# Patient Record
Sex: FEMALE | Race: ASIAN | NOT HISPANIC OR LATINO | Employment: FULL TIME | URBAN - METROPOLITAN AREA
[De-identification: names, ages, dates, MRNs, and addresses within clinical notes are randomized per-mention and may not be internally consistent; named-entity substitution may affect disease eponyms.]

---

## 2017-05-15 ENCOUNTER — COMMUNICATION - HEALTHEAST (OUTPATIENT)
Dept: OBGYN | Facility: HOSPITAL | Age: 27
End: 2017-05-15

## 2019-05-24 ENCOUNTER — AMBULATORY - HEALTHEAST (OUTPATIENT)
Dept: LAB | Facility: CLINIC | Age: 29
End: 2019-05-24

## 2019-05-24 ENCOUNTER — OFFICE VISIT - HEALTHEAST (OUTPATIENT)
Dept: MIDWIFE SERVICES | Facility: CLINIC | Age: 29
End: 2019-05-24

## 2019-05-24 ENCOUNTER — COMMUNICATION - HEALTHEAST (OUTPATIENT)
Dept: ADMINISTRATIVE | Facility: CLINIC | Age: 29
End: 2019-05-24

## 2019-05-24 ENCOUNTER — AMBULATORY - HEALTHEAST (OUTPATIENT)
Dept: MIDWIFE SERVICES | Facility: CLINIC | Age: 29
End: 2019-05-24

## 2019-05-24 DIAGNOSIS — N92.6 MISSED MENSES: ICD-10-CM

## 2019-05-24 DIAGNOSIS — Z32.01 PREGNANCY CONFIRMED BY POSITIVE URINE TEST: ICD-10-CM

## 2019-05-24 LAB — HCG UR QL: POSITIVE

## 2019-05-24 ASSESSMENT — MIFFLIN-ST. JEOR: SCORE: 1201.8

## 2019-06-07 ENCOUNTER — COMMUNICATION - HEALTHEAST (OUTPATIENT)
Dept: OBGYN | Facility: CLINIC | Age: 29
End: 2019-06-07

## 2019-06-08 ENCOUNTER — AMBULATORY - HEALTHEAST (OUTPATIENT)
Dept: MIDWIFE SERVICES | Facility: CLINIC | Age: 29
End: 2019-06-08

## 2019-06-08 ENCOUNTER — COMMUNICATION - HEALTHEAST (OUTPATIENT)
Dept: SCHEDULING | Facility: CLINIC | Age: 29
End: 2019-06-08

## 2019-06-08 ENCOUNTER — COMMUNICATION - HEALTHEAST (OUTPATIENT)
Dept: OBGYN | Facility: CLINIC | Age: 29
End: 2019-06-08

## 2019-06-08 DIAGNOSIS — O03.9 SAB (SPONTANEOUS ABORTION): ICD-10-CM

## 2019-06-10 ENCOUNTER — OFFICE VISIT - HEALTHEAST (OUTPATIENT)
Dept: MIDWIFE SERVICES | Facility: CLINIC | Age: 29
End: 2019-06-10

## 2019-06-10 DIAGNOSIS — O03.9 SAB (SPONTANEOUS ABORTION): ICD-10-CM

## 2019-06-10 DIAGNOSIS — Z87.59 HISTORY OF MISCARRIAGE: ICD-10-CM

## 2019-06-10 LAB — HCG SERPL-ACNC: 24 MLU/ML (ref 0–4)

## 2019-06-10 ASSESSMENT — MIFFLIN-ST. JEOR: SCORE: 1185.35

## 2019-06-11 ENCOUNTER — COMMUNICATION - HEALTHEAST (OUTPATIENT)
Dept: OBGYN | Facility: CLINIC | Age: 29
End: 2019-06-11

## 2019-06-11 ENCOUNTER — AMBULATORY - HEALTHEAST (OUTPATIENT)
Dept: OBGYN | Facility: CLINIC | Age: 29
End: 2019-06-11

## 2019-06-11 DIAGNOSIS — O03.9 SAB (SPONTANEOUS ABORTION): ICD-10-CM

## 2019-06-17 ENCOUNTER — AMBULATORY - HEALTHEAST (OUTPATIENT)
Dept: MIDWIFE SERVICES | Facility: CLINIC | Age: 29
End: 2019-06-17

## 2019-06-17 ENCOUNTER — COMMUNICATION - HEALTHEAST (OUTPATIENT)
Dept: ADMINISTRATIVE | Facility: CLINIC | Age: 29
End: 2019-06-17

## 2019-06-17 ENCOUNTER — OFFICE VISIT - HEALTHEAST (OUTPATIENT)
Dept: MIDWIFE SERVICES | Facility: CLINIC | Age: 29
End: 2019-06-17

## 2019-06-17 DIAGNOSIS — Z01.812 PRE-PROCEDURE LAB EXAM: ICD-10-CM

## 2019-06-17 DIAGNOSIS — O03.9 SAB (SPONTANEOUS ABORTION): ICD-10-CM

## 2019-06-17 DIAGNOSIS — Z30.09 ENCOUNTER FOR OTHER GENERAL COUNSELING OR ADVICE ON CONTRACEPTION: ICD-10-CM

## 2019-06-17 LAB — HCG SERPL-ACNC: 3 MLU/ML (ref 0–4)

## 2019-06-17 ASSESSMENT — MIFFLIN-ST. JEOR: SCORE: 1162.67

## 2019-06-18 ENCOUNTER — OFFICE VISIT - HEALTHEAST (OUTPATIENT)
Dept: MIDWIFE SERVICES | Facility: CLINIC | Age: 29
End: 2019-06-18

## 2019-06-18 ENCOUNTER — AMBULATORY - HEALTHEAST (OUTPATIENT)
Dept: LAB | Facility: CLINIC | Age: 29
End: 2019-06-18

## 2019-06-18 DIAGNOSIS — Z97.5 NEXPLANON IN PLACE: ICD-10-CM

## 2019-06-18 DIAGNOSIS — Z30.017 NEXPLANON INSERTION: ICD-10-CM

## 2019-06-18 DIAGNOSIS — Z01.812 PRE-PROCEDURE LAB EXAM: ICD-10-CM

## 2019-06-18 LAB — HCG UR QL: NEGATIVE

## 2019-11-27 ENCOUNTER — COMMUNICATION - HEALTHEAST (OUTPATIENT)
Dept: MIDWIFE SERVICES | Facility: CLINIC | Age: 29
End: 2019-11-27

## 2021-05-29 NOTE — PROGRESS NOTES
"Subjective:Oly (pronounced E-wan) presents to clinic with her young daughter.  She began having cramping and bleeding last Friday, 1 week ago and was seen in the ER and passed tissue the following day on Saturday.  Patient had a beta hCG drawn on 6/8/2019 which was 68.  She has a future order for a follow-up beta hCG.  I recommended she have this done following her appointment.  She also had an ultrasound which showed that her uterine cavity was empty but could not rule out an ectopic.  Patient denies pelvic pain, in particular she denies unilateral pelvic pain.  Patient feels sure that she passed the pregnancy in the morning prior to going to the emergency department having an ultrasound.  Patient tells me she does not wish to become pregnant and tells me she cannot go through this again.  She is interested in having a Nexplanon placed.  Recommended she come back to clinic for a Nexplanon consultation and that an insertion would be scheduled at that time.  I did talk to her about the possibility of becoming pregnant and recommended she use a condom until another method is in place.    Objective:BP 98/64 (Patient Site: Right Arm, Patient Position: Sitting, Cuff Size: Adult Regular)   Pulse 77   Ht 4' 10\" (1.473 m)   Wt 128 lb (58.1 kg)   LMP 04/15/2019 (Exact Date)   SpO2 96%   BMI 26.75 kg/m      6/8/2019 : quantitative beta-hCG  68    EXAM: US OB < 14 WEEKS WITH TRANSVAGINAL  LOCATION: [facility]  DATE/TIME: 6/8/2019 11:13 AM     INDICATION: bleeding, eval for IUP  COMPARISON: None.  TECHNIQUE: Transabdominal scans were performed. Endovaginal ultrasound was performed to better visualize the embryo.     FINDINGS:  UTERUS: An intrauterine pregnancy is not seen. The endometrial measures 7 mm in diameter     RIGHT OVARY: There is a 1.1 cm hypoechoic area in the right ovary that could be a corpus luteum cyst  LEFT OVARY: Normal.     IMPRESSION:   CONCLUSION:   1.  An intrauterine pregnancy is not seen. " An ectopic pregnancy cannot be excluded     2.  There is a trace free fluid in the cul-de-sac. A 1.1 cm hypoechoic area along the margin the right ovary may be a corpus luteum    Assessment:  29-year-old -0-1-4  First trimester miscarriage  Desires Nexplanon     Plan:  -Follow-up quantitative beta-hCG today  -Return to clinic for Nexplanon consultation.  Patient counseled to use condoms or abstain from intercourse until Nexplanon is placed and follow-up instructions have been reviewed.  -Call the midwife on call at [phone] in the case that unilateral pelvic pain or vaginal bleeding develops.    Total time spent with patient 25 minutes.  >50% of the time spent counseling and coordinating care.    Piyush HERNÁNDEZ CNM    6/10/2019  6:30 PM

## 2021-05-29 NOTE — PROGRESS NOTES
Subjective:  Pt desires Nexplanon insertion for contraception.   Had recent SAB with a beta-hcg quant <3 as of yesterday.    Knows she needs condom use for STI prevention    Nexplanon Insertion Procedure Note    Pre-operative Diagnosis: desires Nexplanon for contraception    PMH:  No known contraindications to Nexplanon  No current or past history of thrombosis or thromboembolic disorders   No hepatic tumors or active liver disease   No undiagnosed abnormal genital bleeding   No known or suspected carcinoma of the breast or personal history of breast cancer   No hypersensitivity to any of the components of NEXPLANON  No known history of keloids    Post-operative Diagnosis: same    Indications: contraception    Procedure Details   Beta-quant done yesterday and was negative. UPT today negative. Has not had IC since prior to SAB.  The risks (including infection, bleeding, pain) and benefits of the procedure were explained to the patient and Written informed consent was obtained.      Pt was positioned on exam table with left, non dominant arm next to her on the table. Measurements taken and insertion marks placed 8 cms from medial epicondyle of elbow and 3-5 cms posteriorly from sulcus, over patient's triceps muscle. Insertion site was cleaned with alcohol prep at 8cm from the medial epicondyle of the humerus  . 1% Lidocaine inserted intradermally. Site then cleansed with Betadine swab x 3.  Nexplanon was inserted at less than 30 degree angle, then applicator leveled horizontally and skin tented, needle inserted full length and slider retracted. Scant bleeding noted at insertion site. Site was covered with band aid and a pressure dressing. Pt instructed not to lift heavy items with left arm for 24 hours, Ibuprofen prn for pain or ice to site as need for bruising.  Implant easily palpated by CNM and patient.     Nexplanon Information:  Lot [id number], Exp 9.23.2021  Removal date:  6/18/2022.    Condition:  Stable    Complications:  None    Plan:  Discussed risks and benefits of nexplanon insertion, discussed MOA, efficacy, and duration of use. Reviewed common side effects including local site reaction to insertion, transient increase in ovarian cysts, irregular bleeding and reviewed possible side effects of progesterones.  Reviewed that risk of pregnancy is low however should a pregnancy occur higher chance that this is a tubal pregnancy, with any positive pregnancy test would recommend immediate visit with provider.  Discussed low risk but possibility of placement in a vein, reviewed how we decrease this chance, but signs reviewed.   Discussed it may improve heavy painful periods, but common complaint of Nexplanon is the irregularity of bleeding that may or may not improve with time, we discussed that use of oral contraception as well as NSAIDs may help with irregular heavy bleeding and encourage the patient to let the nurse midwives know if experiencing the symptoms especially if this may lead to desire to remove device.  The patient was advised to call for any fever or for prolonged or severe pain or bleeding.   She was advised to use OTC ibuprofen as needed for mild to moderate pain.   Advised to use a back up method x 7 days or abstain from sex with individuals who produce sperm.  All questions answered.     BETTY Daly, RAMYA  6/18/2019  1:52 PM

## 2021-05-29 NOTE — TELEPHONE ENCOUNTER
"Telephone encounter: Patient pages due to vaginal bleeding that started at 12:15 PM today.  She felt normal this morning and felt a slight cramp in her pelvis and went to the bathroom and passed \" a lot\" of blood followed by other vaginal bleeding that was \"like a period.\".  She is wearing a pad and has not had a change given her bleeding has only started within the last 30 minutes.  She is very tearful as we are talking on the phone.    She was recently seen for a confirmation of pregnancy visit on 5/24/2019.  She had a positive pregnancy test in clinic.  Her last menstrual period was on 4/15/2019 with cycles occurring every 20 to 30 days.  Per this LMP she is 7 weeks pregnant today.  She has not yet had a first trimester ultrasound.    Per her chart her blood type is B+.    I recommended she go immediately to OB urgent care at [facility] located at [facility].  Discussed with patient that vaginal bleeding in the first trimester can be very serious particularly since she has not yet had a first trimester ultrasound and I do not know if her pregnancy is intrauterine.  Patient has her children with her but states there is another adult she can leave them with at home.  She plans to go to OB urgent care right away.    BETTY Amor,RAMYA        "

## 2021-05-29 NOTE — TELEPHONE ENCOUNTER
"  Call from pt    Had called [facility] yesterday with vaginal bleeding (see note)       She may be about 4-6 wks along in pregnancy       More cramping this am - had passed a \"golf ball\" sized material \"looked like a clot\" - red in color  - not grey / white color         No dizziness / lightheadedness   No pain elsewhere       A/P:   > Directed to ED (she is heading to [facility]) for eval        Teodoro Adrian RN   Triage and Medication Refills            Reason for Disposition    [1] SEVERE vaginal bleeding (i.e., soaking 2 pads / hour, large blood clots) AND [2] present 2 or more hours    Protocols used: PREGNANCY - VAGINAL BLEEDING LESS THAN 20 WEEKS EGA-A-MIRIAN      "

## 2021-05-29 NOTE — PROGRESS NOTES
Subjective:Oly presents to clinic for a Nexplanon and IUD consult. She had a recent SAB will have another quantitative beta hcg lab drawn today.  She has not had intercourse since the SAB and does not wish to become pregnant.     The following were communicated to the patient and all questions were addressed:    Nexplanon as a form of contraception intended to prevent pregnancy. It is greater than 99% effective when used correctly.     No contraceptive method is 100% effective, including Nexplanon    Nexplanon is a hormone mixed in a plastic jesse that is inserted under the skin of the non-dominant arm.     Nexplanon must be removed at the end of 3 years and can be removed sooner if the patient desires.    There is a slight risk of getting a scar, the implant migrating, or an infection from the procedure    Removal is usually a small office procedure however it may be difficult and rarely the implant may not be found when it is time to remove it.  A special procedure may need to be performed, possibly surgery, to remove it.  Some women have changes with their menstrual cycle including irregular bleeding, lighter or heavier bleeding, or bleeding may completely stop.    Nexplanon does not protect from HIV or other sexually transmitted infection.    After placement of Nexplanon a patient should check that it is in place by gently pressing her fingertips over the skin of her arm where the Nexplanon was inserted.  She should be able to feel the small jesse and if not should contact her health care provider.    Patient is recommended to contact her health provider in the case that you have signs or symptoms of infection, do not feel the jesse in place, or if you think you may be pregnant.    Mirena IUD consult completed.  Discussed procedure, risks, and benefits.      BENEFITS: The IUD is 97-99% effective if all the directions regarding its use are followed carfeully. IUDs containing progestin may decrease menstrual flow and  "painful menstrual periods. The IUD provides longer protection from pregnancy (Paragard- 10 years; Mirena - 5 years)  RISKS/ SIDE EFFECTS  1. Spotting, bleeding, hemorrhage or anemia  2. Cramping or pain  3. Partial or complete expulsion of device leading to pregnancy, the pregnancy ending in miscarriage  4. Lost IUD string or other string problems  5. Puncturing of the uterus, embedding, or cervical perforation  6. Increased risk of pelvic inflammatory disease    Patient agrees to either abstain from intercourse for the 2 weeks prior to IUD insertion or have consistently protected intercourse using a condom.  Discussed that the standard of care in the case that she becomes pregnant with the IUD in place is to remove the IUD regardless of her plan for the pregnancy.  Discussed that we perform a urine pregnancy test and GC/CT testing at her insertion visit.    Patient will decide on method and call for an appointment.  She agrees to abstain from sexual intercourse or have protected intercourse for the 2 weeks prior to Nexplanon or IUD insertion        Objective:  /75   Pulse 74   Ht 4' 10\" (1.473 m)   Wt 123 lb (55.8 kg)   LMP 04/15/2019 (Exact Date)   SpO2 98%   Breastfeeding? Unknown   BMI 25.71 kg/m      2019 quantitative beta-hCG 68  6/10/19 quantitative beta-hCG 24  Order placed for follow-up quantitative beta hCG.  Patient has not yet had this lab drawn.       Assessment:  30 yo   First trimester SAB on 19   Desires LARC contraceptive method      Plan:  Patient will decide on method and call for an appointment.  She agrees to abstain from sexual intercourse or have protected intercourse for the 2 weeks prior to Nexplanon or IUD insertion.    Total time spent with patient 25 minutes.  >50% of the time spent counseling and coordinating care.    Piyush HERNÁNDEZ CNM    19  5:00 PM       "

## 2021-05-29 NOTE — PROGRESS NOTES
Telephone call from Dr. Yun in the ED who evaluated patient for vaginal bleeding and possible SAB.  States ultrasound was completed today with following results:      IMPRESSION:   CONCLUSION:   1.  An intrauterine pregnancy is not seen. An ectopic pregnancy cannot be excluded     2.  There is a trace free fluid in the cul-de-sac. A 1.1 cm hypoechoic area along the margin the right ovary may be a corpus luteum    Dr. Yun also reports a beta hcg (68) was drawn today.  Recommends repeat beta hcg in 2 days in clinic.  Dr. Jha was consulted by the ED and agrees with plan.     Future beta hcg orders placed, patient to call clinic on Monday and make a lab only appointment.

## 2021-05-29 NOTE — PATIENT INSTRUCTIONS - HE
Welcome to [facility] and thank you for choosing us for your maternity care provider!  Congratulations!    [facility] Nurse [facility]wives - Contact information:  Appointment line and to get a hold of CNM in clinic Monday-Friday 8 am - 5 pm:  [phone].  There are some clinics with early start times (1st appointment 7:40 am) and others with evening hours (last appointment 6:20 pm).  Most are typically open from 8 am to 5 pm.    CNM on call answering service: [phone].  Specify your hospital of choice and leave a brief message for CNM;  will then page CNM who is on call at your specified hospital and you should receive a call back with 15 minutes.  Be sure that your ringer is audible and that you can accept blocked calls so that we can get back in touch with you! This number should be reserved for urgent needs if during the day, before 8 am, after 5 pm, weekends, holidays.      Pregnancy: Body Changes  From conception (fertilization) until after the birth of your child, you and your baby will change every day. To help you understand what is happening, we ve outlined how pregnancy begins and some of the changes you may notice.  How Pregnancy Begins  Conception is the union of a sperm and an egg. When it occurs, your baby s genetic makeup is complete, even its sex. Fertilization takes place in the fallopian tube. The fertilized egg then travels down this tube to the uterus (womb). The egg attaches to the lining of the uterus about a week later. There it grows and is nourished.    Your Changing Body  Pregnancy affects almost every part of your body. You may notice some of the following physical and emotional changes:    Your uterus expands outward and upward as your baby grows. You may feel pressure on your bladder, stomach, and other organs.    You may notice skin color changes on your forehead, nose, and cheeks. A dark line may form from your bellybutton down to your pubic area. The skin color around your  nipples and thighs may also change.    Pink stretch marks may appear on your abdomen, breasts, or hips.    Your hair may seem thicker. You lose less hair during pregnancy.    You may feel fine one day and weepy the next. This is caused by changes in your body, such as increased hormones (chemicals that affect the function of certain organs and also your moods).      Adapting to Pregnancy: First Trimester  As your body adjusts, you may have to change or limit your daily activities. You ll need more rest. You may also need to use the energy you have more wisely.  Eat stomach-friendly foods like cottage cheese, crackers, or bread throughout the day.    Your Changing Body  Almost every part of your body is affected as you adapt to pregnancy. The uterus and cervix will begin to soften right away. You may not look very pregnant during the first three months. But you are likely to have some common signs of early pregnancy:    Nausea    Fatigue    Frequent urination    Mood swings    Bloating of the abdomen    Missed or light periods (first trimester bleeding)    Nipple or breast tenderness, breast swelling      It s Not Too Late to Start Good Habits  What matters most is protecting your baby from this moment on. If you smoke, drink alcohol, or use drugs, now is the time to stop. If you need help, talk with your health care provider.    Smoking increases the risk of stillbirth or having a low-birth-weight baby. If you smoke, quit now.    Alcohol and drugs have been linked with miscarriage, birth defects, intellectual disability, and low birth weight. Do not drink alcohol or take drugs.    Tips to Relieve Nausea  Although nausea can occur at any time of the day, it may be worse in the morning. To help prevent nausea:    Eat small, light meals at frequent intervals.    Get up slowly. Eat a few unsalted crackers before you get out of bed.    Drink water with lemon slices.    Eat an ice pop in your favorite flavor.    Ask your  health care provider about taking anmol or vitamin B6 for nausea and vomiting.    Talk with your health care provider if you take vitamins that upset your stomach.    Work Concerns  The end of the first trimester is a good time to discuss working during pregnancy with your employer. Follow your health care provider s advice if your job requires you to stand for a long time, work with hazardous tools, or even sit at a desk all day. Your workspace, workload, or scheduled hours may need to be adjusted. Perhaps you can change body postures more often or take an extra break.  Advice for Travel  Talk to your health care provider first, but the second trimester may be the best time for any travel. You may be advised to avoid certain trips while you re pregnant. Food and water can be concerns in developing countries. Travel by car is a good choice, as you can stop, get out, and stretch. Bring snacks and water along. Fasten the lap belt below your belly, low over your hips. Also be sure to wear the shoulder harness.  Intimacy  Unless your health care provider tells you to, there is no reason to stop having sex while you re pregnant. You or your partner may notice changes in desire. Desire may be less in the first trimester, due to nausea and fatigue. In the second trimester, sex may be very enjoyable. The third trimester can be a challenge comfort-wise. Try different positions and see what s best for you both.      Pregnancy: Your First Trimester Changes  The first trimester is a time of rapid development for your baby. Because your baby is growing so quickly, it is important that you start a healthy lifestyle right away. By the end of the first trimester, your baby has formed all of its major body organs and weighs just over an ounce.    Month 1 (Weeks 1-4)  The placenta (the organ that nourishes your baby) begins to form. The heart and lungs begin to develop. Your baby is about 1/4 inch long by the end of the first  month.    Month 2 (Weeks 5-8)  All of your baby s major body organs form. The face, fingers, toes, ears, and eyes appear. By the end of the month, your baby is about 1 inch long.    Month 3 (Weeks 9-12)  Your baby can open and close its fists and mouth. The sexual organs begin to form. As the first trimester ends, your baby is about 4 inches long.      Pregnancy: Your Weight  Being a healthy weight is important for both you and your baby. The weight you gain now is not just extra fat. It is also the weight of your baby. And it is the increased blood and fluids to support the baby. A slow, steady rate of gain is best. How much you should gain depends on your weight before getting pregnant. Check with your health care provider to find out what is right for you.    If You Gain Too Much  Gaining too much weight might cause you to feel tired or you could have a harder pregnancy or birth. If you and your health care provider decide you re gaining too much:    Eat fewer fats and sugars. Instead, eat fruit, vegetables, and whole-grain foods.    Drink plenty of water between meals.    Get at least 20 minutes of light exercise, such as walking, each day.    Don t diet. You might not get enough of the nutrients you or your baby needs.    Keep a diet diary to help you gauge what and how much you are eating .    If You re Not Gaining Enough  If you don t gain enough, your baby could be too small or have health problems. Women tend to gain most of their weight in the second and third trimesters. For now:    Eat many types of foods. Make sure you get enough calcium, protein, and carbohydrates.    Don t skip meals.    Eat healthy snacks.    Pick nutrient-dense, high calorie healthy food like trail mix or protein shakes.    See a dietitian for help.    Talk to your healthcare provider if you have had an eating disorder or problems with certain foods.      Pregnancy: Common Questions  There are plenty of myths and  old wives  tales   surrounding pregnancy. You may need help  fact from fiction. On this sheet, you ll find answers to a few common questions. If you have other questions, talk with a midwife.    Will Working Harm My Baby?  In most cases, working throughout your pregnancy is not harmful at all. There may be concerns if the job involves dangerous machinery or chemicals, lifting, or standing for very long periods of time. Talk to your health care provider and employer about your particular job and pregnancy.  Why Can t I Change the Cat Litter Box?  Cats carry a disease called toxoplasmosis. In adult humans, it shows up as a mild infection of the blood and organs. If you are infected during pregnancy, the baby s brain and eyes could be damaged. To be safe, have someone else change the litter. If you must handle it, wear a paper mask over your nose and mouth. Also, wear gloves and wash your hands afterward.  Which Medications Are Safe?  No prescription or over-the-counter drug is safe for everyone all of the time. But sometimes medications are needed. Be sure your health care provider knows you are pregnant. Then use only the medications he or she advises you to take. Please refer to the below resources for further information and discuss concern and questions with your midwife.  Is It True That I Can Overheat My Baby?  Yes. To avoid making your baby too warm:    Don t sit in a jacuzzi. A long, warm bath is fine, but not in water over 100 F.    Exercise less intensely if you feel fatigued. Base your workout on how you feel, not your heart rate. Heart rates aren t a good way to measure effort during pregnancy.  Can I Lift and Carry Safely?  Yes, if your health care provider doesn t tell you otherwise. Learn to lift and carry safely to avoid injury and reduce back pain during pregnancy. To protect your back:    Bend at the knees to bring the load nearer.    Get a good . Test the weight of the load.    Tighten your abdomen.  Exhale as you lift.    Lift with your legs, not with your back.    Carry the load close to your body.    Hold the load so you can see where you are going.  What If I Get Sick?  Most women get sick at least once during pregnancy. Talk with your health care provider if you do. Most likely it will not affect your pregnancy. Get plenty of rest and fluids, and eat what you can. Talk to your health care provider before taking any medications.        HEALTHY PREGNANCY CARE: 10-14 WEEKS PREGNANT     By weeks 10 to 14 of your pregnancy, the placenta has formed inside your uterus. It may be possible to hear your baby's heartbeat with a doppler ultrasound device. Your baby's eyes can and do move. The arms and legs can bend.    GENETIC SCREENING OPTIONS AT [facility]                All testing is optional. We don t recommend or discourage any test; it is totally up to you and your partner. Some couples wish to know their risk of having a baby with a genetic defect and others do not. We will support your decision. Abnormal results may lead to a discussion of options for further testing.    Accurate dating of your pregnancy is important for all testing so an ultrasound may be done prior to referral or testing.    No testing provides certainty; there are false positives and negatives associated with all testing, some more than others.    Most genetic testing is non-invasive (requires only a blood sample and sometimes an ultrasound or both).    It is always wise to check with your insurance carrier before proceeding.    Some testing can be done at our lab and some require a referral.    If you decide to do no testing, the 20 week ultrasound scan, which is a routine or standard ultrasound, has some ability to detect abnormalities in the baby, and identifies obstetric problems.    If you are over 35, you will have the option of a Level II ultrasound, which is a more detailed and targeting scan, that helps detect fetal anomalies as  well as obstetric problems.      If you have a more complex family history of chromosomal abnormalities, a referral to a genetic counselor and/or a Maternal Fetal Medicine specialist, to help identify available tests, may be recommended.    TYPES OF GENETIC TESTING AVAILABLE INCLUDE:    Carrier Screening/Testing for Genetic Conditions    There are many inherited conditions for which testing or carrier testing is available. Carrier screening can test for conditions like Cystic Fibrosis, Thalassemia, Lam Sachs, Sickle Cell, Hemophilia, Muscular Dystrophy, Yves s disease and many others.     Cystic Fibrosis (CF) affects both males and females and people from all racial and ethnic groups. However, the disease is most common among Caucasians of Northern  descent. CF is also common among Latinos and American Indians. The disease is less common among  Americans and  Americans. More than 10 million Americans are carriers of a faulty CF gene and many of them don't know that they are CF carriers. One or both parents can be tested any time before or during pregnancy.    Talk to your care provider about your family history and whether you should be screened. A referral to a genetic counselor at [facility] or [facility] can be made by your care provider at any time.    This is also tested for in the [place] Metabolic Screen that your infant receives 24 hours after birth.     Fetal DNA cell Testing: [place] or Innatal (Non-Invasive Prenatal Testing)    At 10 wk or greater, a blood sample can be drawn here at clinic or at any of our referral offices. It will provide highly accurate results with low false positive rates for trisomy 18, trisomy 21 (Down Syndrome), and trisomy 13 (> 99% trisomy detection rate at a false positive rate of <0.1%). Gender identification can also be obtained if desired (98% accuracy).  Can also detect some sex-linked chromosomal abnormalities (80-90% accuracy).   This is often done if one of the other screening tests is abnormal.        1st Trimester Screening:     Everyone has an age related risk of having a baby with a genetic abnormality. This testing provides a risk profile which is better than assigning risk based solely on your age.    Refines your risk of having a baby with a chromosomal abnormality such as Trisomy 21 & 18.    This test with detect a fetus with one of these disorders about 85% of the time.    A thickened nuchal fold can also be associated with cardiac defects.    This test requires a blood collection combined with ultrasound to obtain a measurement of fluid at back of baby s neck (nuchal translucency).    False positive results occur approximately 5% of cases.    An additional blood sample may be necessary in order to calculate your risk of having a baby with a neural tube defect (e.g. spina bifida).  This is called the AFP test (alpha fetal protein).  An ultrasound should be able to  any spinal defect as well.    Follow-up of an abnormal test may include a more extensive ultrasound study and you may be offered an amniocentesis (a small sample of amniotic fluid is withdrawn and studied) for a definitive diagnosis    Quad Screen (4-marker screen)    Between 15 and 21 weeks, a sample of blood can be drawn at our lab to assess your risk of having a baby with Down Syndrome, Trisomy 18 and neural tube defects. Such testing is able to detect these conditions in 80% of cases and the false-positive rate is approximately 5%.     Follow-up of an abnormal test may include a more extensive ultrasound study and you may be offered an amniocentesis (a small sample of amniotic fluid is withdrawn and studied) for a definitive diagnosis      Referrals opportunities include:    [facility],  [facility]  o Offers nuchal translucency ultrasound; does not offer genetic counseling.    [place]  [facility] and [facility]  [facility] ([facility]):   o Approximately an hour long visit includes 30 min with genetic counselor who discusses all testing available and which ones might be beneficial to you based on age, personal and family history.    o Blood will be drawn and the nuchal translucency ultrasound will be discussed and performed if desired. The Free Fetal DNA testing ([place]/[employer]) can be drawn also.  o Targeted or detailed Level II ultrasounds are also available with these perinatology groups.        Breastfeeding: a Healthy Option for You and Your Baby  Consider breastfeeding for the healthiest way to feed your baby. Ask your midwife or physician for more information.     The choice of how you will feed your baby is important.  Before your baby s birth, you ll want to learn about the benefits of breastfeeding.  [facility] have been designated Baby Friendly; an initiative that was created by the World Health Organization and UNICEF.  This helps give you and your baby the best start in feeding their baby.    Why should I breastfeed my baby?    Babies are less likely to develop childhood obesity or diabetes    Babies are less likely to suffer from recurrent ear infections    Babies are less likely to be hospitalized for respiratory conditions    Breast milk is rich in nutrients and antibodies-it is easy to digest    How does it benefit me?    Lowers the risk for diabetes, breast and ovarian cancer and postpartum depression    Moms can lose  baby weight  more quickly    Cost savings - formula can cost well over $1,500 per year    Convenient - no bottles and nipples to sterilize, no measuring and mixing formula    The physical contact with breastfeeding can make babies feel secure, warm and comforted     What about formula?  While you and your baby are staying with us at [facility] [place], we will support whatever feeding choice you make for your baby.    Some important considerations:      The American Academy of  Pediatrics, the World Health Organization, and many more organizations recommend exclusive breastfeeding for 6 months and continued breastfeeding while adding other foods for the first 1-2 years.      Any amount of breastmilk has benefits to both baby and mother.    Giving formula in replacement of breastfeeding can affect mother s milk supply.  If formula is needed, hospital staff will work with you on a plan to help develop your milk supply.    Formula alters the natural growth of good bacteria in the  stomach.     Research has found that first time mothers who offer formula in the hospital have a shorter duration of breastfeeding.    How can I start to prepare?     Start by having a conversation with your medical provider.     Talk with your partner, family and friends.     Attend a prenatal class that includes breastfeeding preparation. Birth and breastfeeding classes are offered by [employer]. Visit [email or web address].com for class information.     After your baby s birth, hospital staff and lactation consultants will help you and your baby get off to a great start with breastfeeding.    As your center of gravity and weight changes, use good body mechanics when changing positions and lifting. For example, use a straight back and your legs for support when lifting instead of bending over. Maintain good posture to prevent straining your muscles. Now is a good time to continue or restart your exercise program. Walking 30-60 minutes daily is an excellent way to keep fit. Yoga and swimming also offer many benefits.    The nausea and fatigue of early pregnancy have usually started to let up, so this is a good time to focus on nutrition. Consider attending a nutrition class. A healthy diet includes about 60 grams of protein each day (3-4 servings of dairy, 2-3 servings of meat/fish/poultry/nuts), 4-6 servings of whole grain foods, and 5-6 servings of fruits and vegetables. Remember to drink 6-8  glasses of water daily.    Watch for warning signs, such as     vaginal bleeding    fluid leaking from your vagina    severe abdominal pain    nausea and vomiting more than 4-5 times a day, or if you are unable to keep anything down    fever more than 100.4 degrees F.       RESOURCES   You can refer to the Starting Out Right book or find it online at http://www.[facility].org/images/stories/maternity/[facility]-[email or web address]-Right.pdf or http://www.[facility].org/images/stories/flipbooks/[facility]-starting-out-right/[facility]-starting-out-right.html#p=8    You can sign up for a weekly parenting e-mail that gives support, tips and advice from health care professionals that starts with pregnancy and continues through the toddler years. To register, go to [email or web address] at any time during your pregnancy.    Breastfeeding:    OUTPATIENT LACTATION RESOURCES    [facility] and [facility]: [email or web address]/about-maternity-[email or web address].html       -Schedule an appointment with a [facility] CNM who is also a Lactation Consultant by calling [phone]     -Schedule an appointment with a [facility] CNM who is also a Lactation Consultant by calling [phone]. We see women for breastfeeding visits at [facility] and [facility] Tuesday - Thursday.     -Schedule an outpatient appointment with one of the [facility] Lactation Consultants at [facility], or [facility] by calling [phone]    -Attend a baby weigh in at [facility].  Lactation consultants are available to answer questions  [place]: Tuesdays 1:00 - 2:00  [facility]: Mondays 1:00 - 2:00   [email or web address] Hospital[facility] HostingntDISKOVReer.[email or web address]    -Attend one of the New Mama groups at [facility] in [facility].  [facility] also offers one-on-one in home and in office lactation consults.   [email or web address].com    -Attend a LeLeche League meeting.  Multiple groups in several locations throughout  [facility]. The meetings are no-cost and always informative breastfeeding education session through [employer] La Leche League  Www.lllofmndas.org/    Childbirth and Parenting Education:   [facility]: http://[facility] HospitalDaily Dealy/   [phone] [phone]  [place]: (education, yoga & wellness) www.The Daily Musea.CallMD  Enlightened Mama: www.enlightenedmama.CallMD   Childbirth collective: (Parent topic nights)  www.childbirthcollective.org/  Hypnobabies:  www.hypnobabiestwincities.com/  Hypnobirthing:  Http://hypnobirthing.com/    Book Recommendations:   Neena Alvaro's Birthing From Within--first few chapters include a new-age tone, you may prefer to skip it and keep going, because there is good stuff later.  This book recommendation covers emotional preparation, but does cover coping with pain, and use of both pharmacological and nonpharmacological methods.    Dr. Bond' The Pregnancy Book and The Birth Book--the pregnancy book goes month-by month    Womanly Art of Breastfeeding by La Leche League International   Bestfeeding by Jody Collado--great pictures    Mothering Your Nursing Toddler, by Stormy Rico.   Addresses dealing with so many of the challenging behaviors of a nursing toddler.  How Weaning Happens, by La Leche League.  Discusses weaning at all ages, from medically necessary weaning of an infant, all the way up to age 5 (or older), with why/why not, and strategies.  Very empowering book both for deciding to wean and deciding not to.    American College of Nurse-Midwives (ACNM) http://www.midwife.org/; look at the informational handouts at http://www.midwife.org/Share-With-Women     www.mymidwife.org    Mother to Baby (Medication and Herbal guidance in pregnancy): http://www.mothertobaby.org  Toll-Free Hotline: 637.613.1928  LactMed (Medication use while breastfeeding): http://toxnet.nlm.nih.gov/newtoxnet/lactmed.htm    Women's Health.gov:  http://www.womenshealth.gov/a-z-topics/index.html    [facility]  "pregnancy association - http://americanpregnancy.org    Centering Pregnancy (group prenatal care option): http://centeringhealthcare.org    Information about doulas:  Childbirth collective: http://www.childbirthcollective.org/  [facility] [place] (RAMSES):  www.ramses.org  [facility]  project: http://twincitiesdoulaproject.com/     Early Childhood and Family Education (ECFE):  ECFE offers parents hands-on learning experiences that will nourish a lifetime of teachable moments.  http://ecfe.info/ecfe-home/    [employer] www.[employer]     FDA - Nutrition  www.mypyramid.gov  Under \"For Consumers,\" click on \"pregnant and breastfeeding women.\"      Centers for Disease Control and Prevention (CDC) - Vaccines : http://www.cdc.gov/vaccines/       When researching information on the web, question the validity of websites.  The [employer] .gov, [employer] and[employer]org tend to be more reliable information.  If there are a lot of advertisements, be cautious of the information provided. Stay away from blogs and chat rooms please!      Nutrition & supplements:     Prenatal vitamin (those with 600-1000 mcg folic acid and 27 mg of iron are enough).  Take with food or Juice     4-5 servings of dairy or other calcium rich foods (fish, leafy greens, soy) per day - if not, take 500-1000 mg additional calcium (Tums, pills, chews). Take with dairy     Vitamin D3 1281-3252 IU geltab daily.  Take with fattiest meal.  Look for fortified foods also (Dairy, Juice)     2-3 (4) oz servings of fish, seafood, nuts (walnuts & almonds), oils, avocado per week - if not, take Omega 3 Fatty acids: DHA & TRACEY 7974-1628 mg per day.  Other names: cod liver oil, fish oil. Take with fattiest meal.  Some prenatals have DHA, but typically not a sufficient dose.    Fish: Do not eat shark, swordfish, alta mackerel, or tilefish when you are pregnant or breastfeeding.  They contain high levels of mercury.  Limit white (albacore) tuna to no more than 6 ounces " per week. Http://www.fda.gov/downloads/ForConsumers/ConsumerUpdates/DWS947080.pdf         Touring the Maternity Care Center  To schedule a tour at either [facility] or [facility], please do so online using the following links:  [facility] - [email or web address]  [facility] - [email or web address]     You are invited to  Meet the [facility] Nurse-Midwives  A way to tour the hospital Labor and Delivery unit and meet the midwives that attend births since you may not have the opportunity to meet them during your prenatal care.  Some sessions are informal meet and greet type social hours, others address a specific concern or topic.    Tuesday, Februrary 12, 2019 7-8pm  [facility]    Wednesday, April 17, 2019 7-8pm  [facility]    Thursday, August 15, 2019 7-8pm  [facility]    Wednesday November 13, 2019 7-8 pm  [facility]    Please call [phone] to register

## 2021-05-29 NOTE — TELEPHONE ENCOUNTER
Telephone call from patient.  States she was in clinic yesterday with vaginal bleeding and an early pregnancy.  States she was told she was about 4 weeks pregnant and likely miscarrying due to vaginal bleeding.  States this morning she noted some moderate cramping and then passed a clot that was the a little smaller than a golf ball.  States bleeding has now slowed and she is no longer experiencing any cramping.  Discussed with patient that she most likely passed the products of conception and this is a normal process.  Reviewed that bleeding may increase and she may pass additional clots today.  Patient questioning if she needs to go to ED to determine if she passed the entire pregnancy.  Discussed with patient that since her bleeding is light and she is no longer cramping she does not need to present to the ED at this time.  Encouraged patient to monitor bleeding and if she begins soaking a pad an hour or develops a fever or intense abdominal pain then she would need evaluation in the ED. Encouraged patient to make a follow up clinic visit next week for reassurance if patient desires.  Patient states an understanding.  All questions answered.  Blood type is B positive.

## 2021-05-29 NOTE — PROGRESS NOTES
"CONFIRMATION OF PREGNANCY VISIT    ASSESSMENT:     , at 5w 4d by LMP  Positive Urine Pregnancy Test       PLAN:   1. Discussed working Estimated YOB: 2020 by certain LMP.  Regular periods every month.   2. Oriented to HE CNM care; group and practice info reviewed.   3. 1st trimester teaching: encouraged well-balanced diet, pre-antonio vitamins, vitamin D3 and omega 3 supplements, daily and walking for exercise. Discussed warning signs and when to call.   4. She will schedule her initial OB visit in 6 weeks, with pap screening (last 2014, AISSATOU, due).    Total time spent with patient 30 minutes, >50% counseling, education and coordination of care.     SUBJECTIVE:     Oly Renee is a 29 y.o. y.o.  who presents for pregnancy confirmation. Pregnancy is unplanned.  Contraception: none.  Used Depo for a year, didn't like side effects and stopped.    Current symptoms also include: fatigue.  Denies nausea, vomiting, breast tenderness, cramping, and bleeding.     LMP 4/15/2019. She is certain of this date. Menstrual cycles are regular, occuring every 28-30 days.  Last period was normal.    Has four children, Anthony 6y, Sage 5y, Tanner 5y, and Bianka nearly 2y.    Review of Systems  Denies bleeding, pain or cramping, abnormal vaginal discharge or dysuria.    OBJECTIVE:     /64 (Patient Site: Right Arm, Patient Position: Sitting, Cuff Size: Adult Regular)   Pulse 72   Ht 4' 10.75\" (1.492 m)   Wt 129 lb (58.5 kg)   LMP 04/15/2019 (Exact Date)   Breastfeeding? No   BMI 26.28 kg/m     General: alert and no acute distress      Lab Review  Urine HCG: positive      BETTY Segovia, RAMYA, CLC  2019 12:32 PM       "

## 2021-05-29 NOTE — TELEPHONE ENCOUNTER
Reviewed lab results with patient, offered to follow HCG to zero or expectant management with follow up, patient prefers following hcg level to zero, future lab order placed.

## 2021-06-02 VITALS — BODY MASS INDEX: 26.87 KG/M2 | HEIGHT: 58 IN | WEIGHT: 128 LBS

## 2021-06-02 VITALS — WEIGHT: 129 LBS | BODY MASS INDEX: 26 KG/M2 | HEIGHT: 59 IN

## 2021-06-03 VITALS — BODY MASS INDEX: 25.82 KG/M2 | WEIGHT: 123 LBS | HEIGHT: 58 IN

## 2021-06-03 VITALS — BODY MASS INDEX: 25.5 KG/M2 | WEIGHT: 122 LBS

## 2021-06-16 PROBLEM — Z32.01 PREGNANCY CONFIRMED BY POSITIVE URINE TEST: Status: ACTIVE | Noted: 2019-05-24

## 2021-06-16 PROBLEM — Z30.09 ENCOUNTER FOR OTHER GENERAL COUNSELING OR ADVICE ON CONTRACEPTION: Status: ACTIVE | Noted: 2019-06-18

## 2021-06-16 PROBLEM — Z87.59 HISTORY OF MISCARRIAGE: Status: ACTIVE | Noted: 2019-06-12

## 2021-07-13 ENCOUNTER — NURSE TRIAGE (OUTPATIENT)
Dept: NURSING | Facility: CLINIC | Age: 31
End: 2021-07-13

## 2021-07-13 NOTE — TELEPHONE ENCOUNTER
nexplanon - she is having spotting and that has been going on for a week    She has had the implant for 2 years    Also having some sharp pain that comes and goes    When she has the pain the pain will last almost a minute    The pain started a week ago    The spotting is bright pink    She has to wears a panty liner and the spotting comes and goes    The spotting is not fill a panty liner    The implant was placed on the left arm    Patient should be seen in the next 3 days      Rebekah Ball RN  [place] Nurse Advisor  8:02 AM  7/13/2021    COVID 19 Nurse Triage Plan/Patient Instructions    Please be aware that novel coronavirus (COVID-19) may be circulating in the community. If you develop symptoms such as fever, cough, or SOB or if you have concerns about the presence of another infection including coronavirus (COVID-19), please contact your health care provider or visit [email or web address].     Disposition/Instructions    In-Person Visit with provider recommended. Reference Visit Selection Guide.    Thank you for taking steps to prevent the spread of this virus.  o Limit your contact with others.  o Wear a simple mask to cover your cough.  o Wash your hands well and often.    Resources    [facility]: About COVID-19: [email or web address].[employer] [email or web address]    CDC: What to Do If You're Sick: www.cdc.gov/coronavirus/2019-ncov/about/steps-when-sick.html    CDC: Ending Home Isolation: www.cdc.gov/coronavirus/2019-ncov/hcp/disposition-in-home-patients.html     CDC: Caring for Someone: www.cdc.gov/coronavirus/2019-ncov/if-you-are-sick/care-for-someone.html     [facility]: Interim Guidance for Hospital Discharge to Home: www.health.[facility].[place]/diseases/coronavirus/hcp/hospdischarge.pdf    [facility] clinical trials (COVID-19 research studies): clinicalaffairs.[facility]/umn-clinical-trials     Below are the COVID-19 hotlines at the [place] Department of Health ([facility]). Interpreters are available.    o For health questions: Call 769-361-7321 or 1-270.334.1432 (7 a.m. to 7 p.m.)  o For questions about schools and childcare: Call 768-136-0899 or 1-949.403.8548 (7 a.m. to 7 p.m.)                         Reason for Disposition    Irregular vaginal bleeding or spotting and has a birth control implant    Patient wants to be seen    Additional Information    Negative: SEVERE abdominal pain (e.g., excruciating) and present > 1 hour    Negative: SEVERE vaginal bleeding (e.g., soaking 2 pads or tampons per hour) and present 2 or more hours    Negative: MODERATE vaginal bleeding (e.g., soaking 1 pad or tampon per hour and present > 6 hours)    Negative: Constant abdominal pain and present > 2 hours    Negative: Patient sounds very sick or weak to the triager    Negative: Caller has URGENT question and triager unable to answer question    Negative: Implant site looks infected (spreading redness, red streak, or pus) and NO fever    Negative: Implant looks like it is coming out    Protocols used: CONTRACEPTION - IMPLANT SYMPTOMS AND [id number]-T-SH

## 2021-07-14 ENCOUNTER — OFFICE VISIT (OUTPATIENT)
Dept: FAMILY MEDICINE | Facility: CLINIC | Age: 31
End: 2021-07-14
Payer: COMMERCIAL

## 2021-07-14 VITALS
DIASTOLIC BLOOD PRESSURE: 76 MMHG | WEIGHT: 134.5 LBS | HEART RATE: 65 BPM | HEIGHT: 60 IN | BODY MASS INDEX: 26.41 KG/M2 | SYSTOLIC BLOOD PRESSURE: 119 MMHG | TEMPERATURE: 98.1 F

## 2021-07-14 DIAGNOSIS — R10.2 PELVIC PAIN IN FEMALE: Primary | ICD-10-CM

## 2021-07-14 DIAGNOSIS — Z12.4 PAP SMEAR FOR CERVICAL CANCER SCREENING: ICD-10-CM

## 2021-07-14 PROBLEM — Z32.01 PREGNANCY CONFIRMED BY POSITIVE URINE TEST: Status: RESOLVED | Noted: 2019-05-24 | Resolved: 2021-07-14

## 2021-07-14 PROBLEM — Z30.09 ENCOUNTER FOR OTHER GENERAL COUNSELING OR ADVICE ON CONTRACEPTION: Status: RESOLVED | Noted: 2019-06-18 | Resolved: 2021-07-14

## 2021-07-14 PROBLEM — Z87.59 HISTORY OF MISCARRIAGE: Status: RESOLVED | Noted: 2019-06-12 | Resolved: 2021-07-14

## 2021-07-14 LAB
ALBUMIN UR-MCNC: NEGATIVE MG/DL
APPEARANCE UR: CLEAR
BACTERIA #/AREA URNS HPF: ABNORMAL /HPF
BILIRUB UR QL STRIP: NEGATIVE
CLUE CELLS: NORMAL
COLOR UR AUTO: YELLOW
GLUCOSE UR STRIP-MCNC: NEGATIVE MG/DL
HCG UR QL: NEGATIVE
HGB UR QL STRIP: ABNORMAL
KETONES UR STRIP-MCNC: NEGATIVE MG/DL
LEUKOCYTE ESTERASE UR QL STRIP: ABNORMAL
MUCOUS THREADS #/AREA URNS LPF: PRESENT /LPF
NITRATE UR QL: NEGATIVE
PH UR STRIP: 6 [PH] (ref 5–8)
RBC #/AREA URNS AUTO: ABNORMAL /HPF
SP GR UR STRIP: 1.02 (ref 1–1.03)
SQUAMOUS #/AREA URNS AUTO: ABNORMAL /LPF
TRICHOMONAS, WET PREP: NORMAL
UROBILINOGEN UR STRIP-ACNC: 0.2 E.U./DL
WBC #/AREA URNS AUTO: ABNORMAL /HPF
WBC'S/HIGH POWER FIELD, WET PREP: NORMAL
YEAST, WET PREP: NORMAL

## 2021-07-14 PROCEDURE — 81001 URINALYSIS AUTO W/SCOPE: CPT | Performed by: PHYSICIAN ASSISTANT

## 2021-07-14 PROCEDURE — 87210 SMEAR WET MOUNT SALINE/INK: CPT | Performed by: PHYSICIAN ASSISTANT

## 2021-07-14 PROCEDURE — 87086 URINE CULTURE/COLONY COUNT: CPT | Performed by: PHYSICIAN ASSISTANT

## 2021-07-14 PROCEDURE — 87591 N.GONORRHOEAE DNA AMP PROB: CPT | Performed by: PHYSICIAN ASSISTANT

## 2021-07-14 PROCEDURE — 87491 CHLMYD TRACH DNA AMP PROBE: CPT | Performed by: PHYSICIAN ASSISTANT

## 2021-07-14 PROCEDURE — 81025 URINE PREGNANCY TEST: CPT | Performed by: PHYSICIAN ASSISTANT

## 2021-07-14 PROCEDURE — 87624 HPV HI-RISK TYP POOLED RSLT: CPT | Performed by: PHYSICIAN ASSISTANT

## 2021-07-14 PROCEDURE — G0123 SCREEN CERV/VAG THIN LAYER: HCPCS | Performed by: PHYSICIAN ASSISTANT

## 2021-07-14 PROCEDURE — 99214 OFFICE O/P EST MOD 30 MIN: CPT | Performed by: PHYSICIAN ASSISTANT

## 2021-07-14 ASSESSMENT — MIFFLIN-ST. JEOR: SCORE: 1244.1

## 2021-07-14 NOTE — PROGRESS NOTES
"  Subjective:      Oly Renee is a 31 year old female with chief complaint of abdominal pain.  She has pain primarily in the lower abdomen.  Is been present for 1 week.  May be getting a little bit better.  Comes and goes.  Sometimes it sharp, other times it is crampy.  No history of GERD or current GERD symptoms.  No known sick contacts.  No significant changes in diet or activity recently.  No changes in medications.  No diarrhea.  She has been bloated recently.  May be increased urinary frequency, no pain with urination, no vaginal discharge.  No fevers, nausea, or vomiting.  Normal appetite.  She has had Nexplanon in for 2 years.  The first year she did not have any bleeding at all.  Later on when the second year started she started having spotting.  She now has spotting that comes and goes.    Patient Active Problem List   Diagnosis     Nexplanon in place (6/18/19)        Current Outpatient Medications:      etonogestrel (NEXPLANON) 68 mg Impl implant, [ETONOGESTREL (NEXPLANON) 68 MG IMPL IMPLANT] 1 each by Subdermal route once., Disp: , Rfl:       Objective:     Allergies:  Patient has no known allergies.    /76 (BP Location: Right arm, Patient Position: Sitting, Cuff Size: Adult Regular)   Pulse 65   Temp 98.1  F (36.7  C) (Temporal)   Ht 1.52 m (4' 11.84\")   Wt 61 kg (134 lb 8 oz)   LMP  (LMP Unknown)   BMI 26.41 kg/m    Body mass index is 26.41 kg/m .    General: Alert and oriented x 3, in no apparent distress  Cardiac: Regular rate and rhythm, no murmurs  Pulmonary: Lungs clear to auscultation bilaterally, No crackles, rales, rhonchi, or wheezing noted  Abdomen: Non tender to palpation, no hepatosplenomegaly, negative Rodriguez's sign, no pain over McBurney's point, positive bowel sounds, no masses palpable  Genitourinary: External genitalia is normal in appearance, vaginal walls are healthy, cervix is well visualized and normal in appearance, no significant discharge noted, minimal blood present, " Pap taken without difficulty.  Normal bimanual exam, no cervical motion tenderness or adnexal pain bilaterally, no masses palpable    Results for orders placed or performed in visit on 07/14/21   UA reflex to Microscopic - lab collect     Status: Abnormal   Result Value Ref Range    Color Urine Yellow Colorless, Straw, Light Yellow, Yellow    Appearance Urine Clear Clear    Glucose Urine Negative Negative mg/dL    Bilirubin Urine Negative Negative    Ketones Urine Negative Negative mg/dL    Specific Gravity Urine 1.025 1.005 - 1.030    Blood Urine Large (A) Negative    pH Urine 6.0 5.0 - 8.0    Protein Albumin Urine Negative Negative mg/dL    Urobilinogen Urine 0.2 0.2, 1.0 E.U./dL    Nitrite Urine Negative Negative    Leukocyte Esterase Urine Trace (A) Negative   HCG qualitative urine     Status: Normal   Result Value Ref Range    hCG Urine Qualitative Negative Negative   Urine Microscopic Exam     Status: Abnormal   Result Value Ref Range    Bacteria Urine Few (A) None Seen /HPF    RBC Urine 5-10 (A) 0-2 /HPF /HPF    WBC Urine 0-5 0-5 /HPF /HPF    Squamous Epithelials Urine Few (A) None Seen /LPF    Mucus Urine Present (A) None Seen /LPF   Wet prep - lab collect     Status: Normal    Specimen: Vagina; Swab   Result Value Ref Range    Trichomonas Absent Absent    Yeast Absent Absent    Clue Cells Absent Absent    WBCs/high power field None None   Other labs pending.    Assessment and Plan:     1. Pelvic pain in female  Differential includes constipation, pelvic infection, UTI, nonspecific abdominal/pelvic pain, versus other.  She does not have an acute abdomen.  Exam today was grossly normal and reassuring.  I will follow-up with pending labs.  If all labs normal, we could continue to monitor, since she seems to be improving a little.  Other options would be to do pelvic ultrasound, H. pylori testing, and/or abdominal pelvic CT.  I think she probably has at least some constipation going on based on her reported  symptoms.  I encouraged her to take MiraLAX and water for the next several days.  Pregnancy unlikely, given current Nexplanon use.  However C test ordered today as a precaution and it was negative.  - Wet prep - lab collect; Future  - Chlamydia trachomatis PCR; Future  - Neisseria gonorrhoeae PCR  - UA reflex to Microscopic - lab collect; Future  - Urine Culture Aerobic Bacterial; Future  - HCG qualitative urine; Future  - Pap Screen Thin Prep with HPV - recommended age 30 - 65 years (select HPV order below); Future  - UA reflex to Microscopic - lab collect  - HCG qualitative urine    2. Pap smear for cervical cancer screening  Patient will be informed of results when available.  Nexplanon for birth control.  - Pap Screen Thin Prep with HPV - recommended age 30 - 65 years (select HPV order below); Future  - Pap Screen Thin Prep with HPV - recommended age 30 - 65 years (select HPV order below)    30 minutes spent on the date of the encounter doing chart review, history and exam, and documentation.      This dictation uses voice recognition software, which may contain typographical errors.

## 2021-07-14 NOTE — LETTER
[facility]  [address] [place] [address]  [phone]          July 14, 2021    Oly Renee     1990                                                                                                                       To whom it may concern:    Oly Renee was seen today at [facility] [place] for an office visit.  Please excuse her from work during this time.      Sincerely,        Candie St PA-C

## 2021-07-15 LAB — BACTERIA UR CULT: NO GROWTH

## 2021-07-16 LAB
C TRACH DNA SPEC QL NAA+PROBE: NEGATIVE
N GONORRHOEA DNA SPEC QL NAA+PROBE: NEGATIVE

## 2021-07-19 LAB
HUMAN PAPILLOMA VIRUS 16 DNA: NEGATIVE
HUMAN PAPILLOMA VIRUS 18 DNA: NEGATIVE
HUMAN PAPILLOMA VIRUS FINAL DIAGNOSIS: NORMAL
HUMAN PAPILLOMA VIRUS OTHER HR: NEGATIVE

## 2021-07-22 LAB
BKR LAB AP GYN ADEQUACY: NORMAL
BKR LAB AP GYN INTERPRETATION: NORMAL
BKR LAB AP HPV REFLEX: NORMAL
BKR LAB AP PREVIOUS ABNORMAL: NORMAL
PATH REPORT.COMMENTS IMP SPEC: NORMAL
PATH REPORT.RELEVANT HX SPEC: NORMAL

## 2021-08-21 ENCOUNTER — HEALTH MAINTENANCE LETTER (OUTPATIENT)
Age: 31
End: 2021-08-21

## 2021-10-16 ENCOUNTER — HEALTH MAINTENANCE LETTER (OUTPATIENT)
Age: 31
End: 2021-10-16

## 2022-05-17 ENCOUNTER — TELEPHONE (OUTPATIENT)
Dept: FAMILY MEDICINE | Facility: CLINIC | Age: 32
End: 2022-05-17
Payer: COMMERCIAL

## 2022-05-17 NOTE — TELEPHONE ENCOUNTER
She has a virtual appointment with me on Thursday for Nexplanon replacement.  This needs to be an in office visit, so we need to change her appointment to a different time.

## 2022-05-17 NOTE — CONFIDENTIAL NOTE
Spoke with the patient and she wanted this to be removed and replaced because she is due for a new one next month, appointment rescheduled as in person.

## 2022-05-19 ENCOUNTER — OFFICE VISIT (OUTPATIENT)
Dept: FAMILY MEDICINE | Facility: CLINIC | Age: 32
End: 2022-05-19

## 2022-05-19 VITALS
SYSTOLIC BLOOD PRESSURE: 115 MMHG | WEIGHT: 134 LBS | DIASTOLIC BLOOD PRESSURE: 76 MMHG | OXYGEN SATURATION: 96 % | TEMPERATURE: 98.8 F | BODY MASS INDEX: 26.31 KG/M2 | RESPIRATION RATE: 18 BRPM | HEART RATE: 87 BPM

## 2022-05-19 DIAGNOSIS — Z30.46 NEXPLANON REMOVAL: Primary | ICD-10-CM

## 2022-05-19 DIAGNOSIS — Z30.46 SURVEILLANCE OF PREVIOUSLY PRESCRIBED IMPLANTABLE SUBDERMAL CONTRACEPTIVE: ICD-10-CM

## 2022-05-19 DIAGNOSIS — Z97.5 NEXPLANON IN PLACE: ICD-10-CM

## 2022-05-19 DIAGNOSIS — Z30.017 NEXPLANON INSERTION: ICD-10-CM

## 2022-05-19 PROCEDURE — 11983 REMOVE/INSERT DRUG IMPLANT: CPT | Performed by: PHYSICIAN ASSISTANT

## 2022-05-19 RX ORDER — LIDOCAINE HYDROCHLORIDE AND EPINEPHRINE 10; 10 MG/ML; UG/ML
2 INJECTION, SOLUTION INFILTRATION; PERINEURAL ONCE
Status: COMPLETED | OUTPATIENT
Start: 2022-05-19 | End: 2022-05-19

## 2022-05-19 RX ADMIN — LIDOCAINE HYDROCHLORIDE AND EPINEPHRINE 2 ML: 10; 10 INJECTION, SOLUTION INFILTRATION; PERINEURAL at 11:25

## 2022-05-19 NOTE — PROGRESS NOTES
Subjective:    Oly Renee is a 32 year old female who presents for Nexplanon replacement.  Current Nexplanon is due to  in about 1 month.  She would like old Nexplanon removed and new one placed.  No concerns.    Of note, she states she has had her COVID vaccines.    Patient Active Problem List   Diagnosis     Nexplanon in place (19)       Current Outpatient Medications:      etonogestrel (NEXPLANON) 68 mg Impl implant, [ETONOGESTREL (NEXPLANON) 68 MG IMPL IMPLANT] 1 each by Subdermal route once., Disp: , Rfl:       Objective:   Allergies:  Patient has no known allergies.    /76 (BP Location: Left arm, Patient Position: Sitting, Cuff Size: Adult Regular)   Pulse 87   Temp 98.8  F (37.1  C) (Temporal)   Resp 18   Wt 60.8 kg (134 lb)   SpO2 96%   BMI 26.31 kg/m    Body mass index is 26.31 kg/m .    General: Alert and oriented x 3, in no apparent distress      Procedure:  Left upper inner arm was adequately anesthetized with 2.5 cc of lidocaine with Epi.  Then, using sterile technique, 5 mm incision was made with an 11 blade.  Nexplanon was palpated subcutaneously and removed with a hemostat clamp.  Then, using sterile technique, new Nexplanon was inserted and jesse was deployed without difficulty.  New Nexplanon jesse was palpable subcutaneously by myself.  Incision site was closed with steri-strips and wrapped with a pressure bandage.  Patient was neurovascularly intact after exam.  Appropriate wound aftercare was dicussed with patient.         Assessment and Plan:     1. Old Nexplanon removal  Old Nexplanon removed today.    2. New Nexplanon insertion  Insertion Date: 22  3 Year Expiration Date: 25  Consent form was reviewed with patient, signed, and will be scanned in to her chart.  She knows to use back-up birth control for the next 1 week.   - etonogestrel (NEXPLANON) subdermal implant 68 mg  - etonogestrel (NEXPLANON) 68 MG IMPL; 1 each (68 mg) by Subdermal route once  - REMOVAL  AND REINSERTION NEXPLANON        This dictation uses voice recognition software, which may contain typographical errors.

## 2022-05-23 ENCOUNTER — NURSE TRIAGE (OUTPATIENT)
Dept: NURSING | Facility: CLINIC | Age: 32
End: 2022-05-23
Payer: COMMERCIAL

## 2022-05-23 DIAGNOSIS — Z30.9 ENCOUNTER FOR CONTRACEPTIVE MANAGEMENT, UNSPECIFIED TYPE: Primary | ICD-10-CM

## 2022-05-23 NOTE — TELEPHONE ENCOUNTER
"Pt reports her Nexplanon was replaced 5/19/22 L upper inner arm (\"same spot a little different angle\".  Pt denies redness or swelling at site of insertion \"just a little bruise\". States she has pain at insertion site with certain movements and when sleeping on it, reports pain is \"7-8, usually lasts a couple of seconds\" See full assessment below. Pt also reports she has concerns about pregnancy.     Advised pt message will be sent to clinic for PCP advice per protocol. Call back if new or worsening symptoms prior to hearing back from clinic. Advised pt if any doubt about pregnancy she should take a test.     Pt verbalizes understanding and agrees to plan.       Reason for Disposition    [1] Caller has NON-URGENT question AND [2] triager unable to answer question    Additional Information    Negative: [1] Abdominal pain AND [2] pregnant < 20 weeks    Negative: [1] Vaginal bleeding AND [2] pregnant < 20 weeks    Negative: Vaginal discharge is main symptom    Negative: [1] SEVERE abdominal pain (e.g., excruciating) AND [2] present > 1 hour    Negative: [1] SEVERE vaginal bleeding (e.g., soaking 2 pads or tampons per hour) AND [2] present 2 or more hours    Negative: Patient sounds very sick or weak to the triager    Negative: MODERATE vaginal bleeding (i.e., soaking 1 pad or tampon per hour and present > 6 hours)    Negative: [1] Constant abdominal pain AND [2] present > 2 hours    Negative: [1] Caller has URGENT question AND [2] triager unable to answer question    Negative: [1] Implant site looks infected (e.g., spreading redness, red streak, or pus) AND [2] NO fever    Negative: Implant looks like it is coming out    Negative: [1] Caller has NON-URGENT question AND [2] triager unable to answer question    Negative: Pregnant    Negative: [1] Vaginal bleeding with > 6 soaked pads or tampons per day AND [2] lasts > 7 days    Answer Assessment - Initial Assessment Questions  1. IMPLANT TYPE: \"What type of implant are " "you using?\"  (e.g., Implanon, Nexplanon, Norplant, Jadelle)       Nexlanon  2. IMPLANT START DATE: \"When did you first start using the implant?\"     Second one placed 5/19/22  3. IMPLANT LOCATION: \"Where is implant located?\" (e.g., abdomen, inside/outside, right/left)      L  4. SYMPTOM: \"What is the main symptom (or question) you're concerned about?\"      \"Burning and poking here and there depending on how I move my arm\"  5. ONSET: \"When did the symptoms start?\"      Since 5/19/22  6. VAGINAL BLEEDING: \"Are you having any unusual vaginal bleeding?\"      - NONE      - SPOTTING: spotting or pinkish / brownish mucous discharge; does not fill panty-liner or pad      - MILD: less than 1 pad / hour; less than patient's usual menstrual bleeding      - MODERATE: 1-2 pads / hour; small-medium blood clots (e.g., pea, grape, small coin)      - SEVERE: soaking 2 or more pads/hour for 2 or more hours; bleeding not contained by pads or tampons      n/a  7. PAIN: \"Is there any pain?\" (Scale: 1-10; mild, moderate, severe).      \"can feel skin where inserted tightened, no pain right now but if move a certain way 7-8 pain and burning sensation, lasts a couple of seconds, relax arm and goes away, pain comes back with certain movements or if sleeping on\"  8. PREGNANCY: \"Are you concerned you might be pregnant?\"     Pt states \"yes\"    Protocols used: CONTRACEPTION - IMPLANT SYMPTOMS AND [id number]      "

## 2022-05-24 NOTE — TELEPHONE ENCOUNTER
RN routing message to [place], per Nurse Triage note below.    RUDOLPH PaceN, RN   [facility]

## 2022-05-24 NOTE — TELEPHONE ENCOUNTER
It can be normal to have a little pain in your arm after putting Nexplanon in.  It should get better on its own.  If she wanted to try ibuprofen or Tylenol, heat, or massage she could.  I would like her to give this 2 more weeks to see if it goes away on its own.    We did not do a pregnancy test at her visit, because her birth control was still active.  However, if she is concerned about pregnancy she could take a home pregnancy test, or come in to the clinic for a lab only visit.

## 2022-05-24 NOTE — TELEPHONE ENCOUNTER
RN attempt to call pt regarding Candie's message below. No answer. Voice mailbox is full and unable to leave VM.     Will attempt to call pt again another time.    RUDOLPH PaceN, RN   [facility]

## 2022-05-25 NOTE — TELEPHONE ENCOUNTER
RN attempt #2 to call pt regarding Candie's message below. No answer. Voice mailbox is full, unable to leave message.    Will try to reach pt again, another time.      RUDOLPH PaceN, RN   [facility]

## 2022-05-26 ENCOUNTER — TELEPHONE (OUTPATIENT)
Dept: FAMILY MEDICINE | Facility: CLINIC | Age: 32
End: 2022-05-26
Payer: COMMERCIAL

## 2022-05-26 NOTE — TELEPHONE ENCOUNTER
Reason for Call:  Other returning call    Detailed comments: Oly called back after a missed call earlier today from the nurse triage line. Unfortunately she called after the [facility] had closed & was not interested in speaking with an FNA after hours nurse.     She would like to request that any future calls be made after 4:30pm. All the attempts previously have been made while she is at work & unable to answer calls.     Phone Number Patient can be reached at: Cell number on file:    Telephone Information:   Mobile [phone]       Best Time: after 4:30pm daily    Can we leave a detailed message on this number? previous encounter states voice mailbox is full    Call taken on 5/26/2022 at 5:16 PM by Yeimy Onofre

## 2022-05-26 NOTE — TELEPHONE ENCOUNTER
RN made 3rd attempt to contact patient, but no answer. Left message with patient's sister to call clinic back.    Nivia Chavarria RN  [facility]

## 2022-05-27 NOTE — TELEPHONE ENCOUNTER
"RN made comment under Contact information:  \"Any calls to be made after 4:30 PM. 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    RUDOLPH PaceN, RN   [facility]    "

## 2022-05-27 NOTE — TELEPHONE ENCOUNTER
Contacted patient and relayed message below from Candie CARL.  Patient's arm pain better and only mild pain with touching or laying on arm. Patient's cravings are gone, but having acne breakouts.    Scheduled lab appointment on 5/31/2022  Message routed to Candie CARL for KRISTINE Chavarria RN  [facility]

## 2022-05-31 ENCOUNTER — LAB (OUTPATIENT)
Dept: LAB | Facility: CLINIC | Age: 32
End: 2022-05-31
Payer: COMMERCIAL

## 2022-05-31 DIAGNOSIS — Z30.9 ENCOUNTER FOR CONTRACEPTIVE MANAGEMENT, UNSPECIFIED TYPE: ICD-10-CM

## 2022-05-31 LAB — HCG UR QL: NEGATIVE

## 2022-05-31 PROCEDURE — 81025 URINE PREGNANCY TEST: CPT

## 2022-06-14 ENCOUNTER — NURSE TRIAGE (OUTPATIENT)
Dept: NURSING | Facility: CLINIC | Age: 32
End: 2022-06-14
Payer: COMMERCIAL

## 2022-06-14 NOTE — TELEPHONE ENCOUNTER
"Patient calling reporting she had Nexplanon placed (in left arm) 5/19/22.  See 5/23/22 triage note.    Patient reporting ongoing intermittent \"soreness, weakness of arm.\"   Denies pain during triage.    Reporting when she makes a fist with left hand she feels a \"sting.\"   Reporting feeling \"thin\" implant in arm.   Afebrile.  Denies redness or change in skin coloring.  Note per Candie CARL from 5/23/22.    It can be normal to have a little pain in your arm after putting Nexplanon in.  It should get better on its own.  If she wanted to try ibuprofen or Tylenol, heat, or massage she could.  I would like her to give this 2 more weeks to see if it goes away on its own.          Reviewed signs and symptoms of infection.    Patient prefers to have area looked at in person.    Transferred to Central Scheduling.    Esha Mattson RN  [facility]      Reason for Disposition    Birth Control Implants (Implanon, Nexplanon, Norplant, Jadelle), questions about    Additional Information    Negative: [1] Abdominal pain AND [2] pregnant < 20 weeks    Negative: [1] Vaginal bleeding AND [2] pregnant < 20 weeks    Negative: Vaginal discharge is main symptom    Negative: [1] SEVERE abdominal pain (e.g., excruciating) AND [2] present > 1 hour    Negative: [1] SEVERE vaginal bleeding (e.g., soaking 2 pads or tampons per hour) AND [2] present 2 or more hours    Negative: Patient sounds very sick or weak to the triager    Negative: MODERATE vaginal bleeding (i.e., soaking 1 pad or tampon per hour and present > 6 hours)    Negative: [1] Constant abdominal pain AND [2] present > 2 hours    Negative: [1] Caller has URGENT question AND [2] triager unable to answer question    Negative: [1] Implant site looks infected (e.g., spreading redness, red streak, or pus) AND [2] NO fever    Negative: Implant looks like it is coming out    Negative: [1] Caller has NON-URGENT question AND [2] triager unable to answer question    " Negative: Pregnant    Negative: [1] Vaginal bleeding with > 6 soaked pads or tampons per day AND [2] lasts > 7 days    Negative: [1] Caller has NON-URGENT question AND [2] triager unable to answer question    Negative: Vaginal bleeding with > 6 soaked pads or tampons per day    Negative: Vaginal bleeding last > 7 days    Negative: [1] Has an Implanon or Nexplanon implant AND [2] more than 3 years since it was placed    Negative: [1] Has a Jadelle or Norplant implant AND [2] more than 5 years since it was placed    Negative: [1] Has birth control implant AND can't feel implant under the skin    Negative: Wants to get birth control implant removed    Negative: [1] No monthly bleeding AND [2] has a birth control implant    Negative: [1] Irregular vaginal bleeding or spotting AND [2] has a birth control implant    Negative: Aftercare instructions for a new birth control implant, questions about    Negative: Aftercare instructions when an implant was removed, questions about    Protocols used: CONTRACEPTION - IMPLANT SYMPTOMS AND [id number]-MN    COVID 19 Nurse Triage Plan/Patient Instructions    Please be aware that novel coronavirus (COVID-19) may be circulating in the community. If you develop symptoms such as fever, cough, or SOB or if you have concerns about the presence of another infection including coronavirus (COVID-19), please contact your health care provider or visit [email or web address].     Disposition/Instructions    In-Person Visit with provider recommended. Reference Visit Selection Guide.    Thank you for taking steps to prevent the spread of this virus.  o Limit your contact with others.  o Wear a simple mask to cover your cough.  o Wash your hands well and often.    Resources    [facility]: About COVID-19: [email or web address]    CDC: What to Do If You're Sick: www.cdc.gov/coronavirus/2019-ncov/about/steps-when-sick.html    CDC: Ending Home Isolation:  www.cdc.gov/coronavirus/2019-ncov/hcp/disposition-in-home-patients.html     CDC: Caring for Someone: www.cdc.gov/coronavirus/2019-ncov/if-you-are-sick/care-for-someone.html     [facility]: Interim Guidance for Hospital Discharge to Home: www.[email or web address].[email or web address]/diseases/[email or web address]/hcp/hospdischarge.pdf    [facility] clinical trials (COVID-19 research studies): [email or web address]-trials     Below are the COVID-19 hotlines at the [place] Department of Health ([facility]). Interpreters are available.   o For health questions: Call 877-238-5061 or 1-484.464.7892 (7 a.m. to 7 p.m.)  o For questions about schools and childcare: Call 482-893-3688 or 1-742.567.8094 (7 a.m. to 7 p.m.)

## 2022-06-30 ENCOUNTER — OFFICE VISIT (OUTPATIENT)
Dept: FAMILY MEDICINE | Facility: CLINIC | Age: 32
End: 2022-06-30
Payer: COMMERCIAL

## 2022-06-30 VITALS
SYSTOLIC BLOOD PRESSURE: 112 MMHG | TEMPERATURE: 98.4 F | RESPIRATION RATE: 18 BRPM | BODY MASS INDEX: 26.81 KG/M2 | HEIGHT: 59 IN | DIASTOLIC BLOOD PRESSURE: 68 MMHG | WEIGHT: 133 LBS | HEART RATE: 108 BPM | OXYGEN SATURATION: 96 %

## 2022-06-30 DIAGNOSIS — Z53.9 ERRONEOUS ENCOUNTER--DISREGARD: ICD-10-CM

## 2022-06-30 DIAGNOSIS — Z97.5 NEXPLANON IN PLACE: Primary | ICD-10-CM

## 2022-06-30 ASSESSMENT — PAIN SCALES - GENERAL: PAINLEVEL: NO PAIN (0)

## 2022-08-18 ENCOUNTER — OFFICE VISIT (OUTPATIENT)
Dept: MIDWIFE SERVICES | Facility: CLINIC | Age: 32
End: 2022-08-18
Payer: COMMERCIAL

## 2022-08-18 VITALS
SYSTOLIC BLOOD PRESSURE: 110 MMHG | BODY MASS INDEX: 27.09 KG/M2 | HEART RATE: 60 BPM | WEIGHT: 134.4 LBS | HEIGHT: 59 IN | DIASTOLIC BLOOD PRESSURE: 70 MMHG

## 2022-08-18 DIAGNOSIS — Z11.3 ROUTINE SCREENING FOR STI (SEXUALLY TRANSMITTED INFECTION): Primary | ICD-10-CM

## 2022-08-18 DIAGNOSIS — Z30.46 SURVEILLANCE OF PREVIOUSLY PRESCRIBED IMPLANTABLE SUBDERMAL CONTRACEPTIVE: ICD-10-CM

## 2022-08-18 DIAGNOSIS — Z30.46 ENCOUNTER FOR REMOVAL AND REINSERTION OF NEXPLANON: ICD-10-CM

## 2022-08-18 DIAGNOSIS — Z97.5 NEXPLANON IN PLACE: ICD-10-CM

## 2022-08-18 LAB
CLUE CELLS: PRESENT
TRICHOMONAS, WET PREP: ABNORMAL
WBC'S/HIGH POWER FIELD, WET PREP: ABNORMAL
YEAST, WET PREP: ABNORMAL

## 2022-08-18 PROCEDURE — 86803 HEPATITIS C AB TEST: CPT | Performed by: ADVANCED PRACTICE MIDWIFE

## 2022-08-18 PROCEDURE — 11983 REMOVE/INSERT DRUG IMPLANT: CPT | Performed by: ADVANCED PRACTICE MIDWIFE

## 2022-08-18 PROCEDURE — 87210 SMEAR WET MOUNT SALINE/INK: CPT | Performed by: ADVANCED PRACTICE MIDWIFE

## 2022-08-18 PROCEDURE — 86780 TREPONEMA PALLIDUM: CPT | Performed by: ADVANCED PRACTICE MIDWIFE

## 2022-08-18 PROCEDURE — 87340 HEPATITIS B SURFACE AG IA: CPT | Performed by: ADVANCED PRACTICE MIDWIFE

## 2022-08-18 PROCEDURE — 87491 CHLMYD TRACH DNA AMP PROBE: CPT | Performed by: ADVANCED PRACTICE MIDWIFE

## 2022-08-18 PROCEDURE — 36415 COLL VENOUS BLD VENIPUNCTURE: CPT | Performed by: ADVANCED PRACTICE MIDWIFE

## 2022-08-18 PROCEDURE — 87389 HIV-1 AG W/HIV-1&-2 AB AG IA: CPT | Performed by: ADVANCED PRACTICE MIDWIFE

## 2022-08-18 PROCEDURE — 87591 N.GONORRHOEAE DNA AMP PROB: CPT | Performed by: ADVANCED PRACTICE MIDWIFE

## 2022-08-18 NOTE — PROGRESS NOTES
"  NEXPLANON REMOVAL/REINSERTION:    Is a pregnancy test required: No.  Was a consent obtained?  Yes    Subjective: Oly Renee is a 32 year old  presents for Nexplanon removal due to burning pain when she sleeps on the arm with nexplanon device, feels it in her armpit.     Patient has been given the opportunity to ask questions about all forms of birth control, including all options appropriate for Oly Renee. Discussed that no method of birth control, except abstinence is 100% effective against pregnancy or sexually transmitted infection.     Oly Renee understands planning removal and reinsertion today    The entire removal and insertion procedure was reviewed with the patient, including care after placement.      /70   Pulse 60   Ht 1.499 m (4' 11\")   Wt 61 kg (134 lb 6.4 oz)   Breastfeeding No   BMI 27.15 kg/m      PROCEDURE NOTE: -- Nexplanon Removal/Insertion    Technique: On the left arm  Skin prep Betadine  Anesthesia 1% lidocaine  Procedure: Patient was placed supine with left arm exposed.  Implant located by palpitation approximately 2cm from healed incision site. Pt requested remove from prior incision site if able.     Patrick was made 8-10 cm above medial epicondyle and a guiding patrick 4 cm above the first.  Arm was prepped with Betadine. Incision point was anesthetized with 1 mL 1% lidocaine.     Small incision (<5mm) was made at distal end of palpable implant, curved hemostat or mosquito forceps was used to isolate the implant and bring it to the incision, the fibrous capsule containing the implant  was incised and the implant was removed intact.    After stretching the skin with thumb and index finger around the insertion site, skin punctured with the tip of the needle inserted at 30 degrees and then lowered to horizontal position. While lifting the skin with the tip of the needle, inserted the needle to its full length. Applicator was then stabilized and the slider was unlocked by " pushing it slightly down. Slider moved back completely until it stopped. Applicator was then removed.    Correct placement of the implant was confirmed by palpation in the patient's arm and visualizing the purple top of the obturator.   Bandage and pressure dressing applied to insertion site.    Lot # [id number]  Exp: 2024 JUL 03    EBL: minimal    Complications: none    ASSESSMENT:     ICD-10-CM    1. Routine screening for STI (sexually transmitted infection)  Z11.3 Wet preparation     Hepatitis C antibody     HIV Antigen Antibody Combo     Treponema Abs w Reflex to RPR and Titer     Chlamydia trachomatis/Neisseria gonorrhoeae by PCR     Hepatitis B surface antigen   2. Encounter for removal and reinsertion of Nexplanon  Z30.46 etonogestrel (NEXPLANON) subdermal implant 68 mg     etonogestrel (NEXPLANON) 68 MG IMPL     REMOVAL AND REINSERTION NEXPLANON   3. Surveillance of previously prescribed implantable subdermal contraceptive  Z30.46         PLAN:  Labs: requests routine STI screening: Hep B, Hep C, RPR, HIV, GC/CT, wet prep.   -Advised on risk for recurrence of device migration as well as risk for nerve involvement with a subdermal implant. Desires removal of current device and replacement with new device today. Consent reviewed and signed together.   -Given 's handouts, including when to have Nexplanon removed, list of danger s/sx, side effects and follow up recommended. Encouraged condom use for prevention of STD. Back up contraception advised for 7 days. Advised to call for any fever, for prolonged or severe pain or bleeding, abnormal vaginal dischage. She was advised to use pain medications (ibuprofen) as needed for mild to moderate pain.     Apoorva Bocanegra CNM

## 2022-08-19 DIAGNOSIS — N76.0 BV (BACTERIAL VAGINOSIS): Primary | ICD-10-CM

## 2022-08-19 DIAGNOSIS — B96.89 BV (BACTERIAL VAGINOSIS): Primary | ICD-10-CM

## 2022-08-19 LAB
C TRACH DNA SPEC QL PROBE+SIG AMP: NEGATIVE
HBV SURFACE AG SERPL QL IA: NONREACTIVE
HCV AB SERPL QL IA: NONREACTIVE
HIV 1+2 AB+HIV1 P24 AG SERPL QL IA: NONREACTIVE
N GONORRHOEA DNA SPEC QL NAA+PROBE: NEGATIVE
T PALLIDUM AB SER QL: NONREACTIVE

## 2022-08-19 RX ORDER — METRONIDAZOLE 500 MG/1
500 TABLET ORAL 2 TIMES DAILY
Qty: 14 TABLET | Refills: 0 | Status: SHIPPED | OUTPATIENT
Start: 2022-08-19 | End: 2022-08-26

## 2022-10-01 ENCOUNTER — HEALTH MAINTENANCE LETTER (OUTPATIENT)
Age: 32
End: 2022-10-01

## 2023-09-08 ENCOUNTER — OFFICE VISIT (OUTPATIENT)
Dept: FAMILY MEDICINE | Facility: CLINIC | Age: 33
End: 2023-09-08
Payer: COMMERCIAL

## 2023-09-08 VITALS
WEIGHT: 134.2 LBS | BODY MASS INDEX: 27.11 KG/M2 | SYSTOLIC BLOOD PRESSURE: 110 MMHG | HEART RATE: 75 BPM | OXYGEN SATURATION: 97 % | TEMPERATURE: 98.9 F | DIASTOLIC BLOOD PRESSURE: 65 MMHG | RESPIRATION RATE: 16 BRPM

## 2023-09-08 DIAGNOSIS — R35.0 URINARY FREQUENCY: Primary | ICD-10-CM

## 2023-09-08 LAB
ALBUMIN UR-MCNC: NEGATIVE MG/DL
APPEARANCE UR: CLEAR
BACTERIA #/AREA URNS HPF: ABNORMAL /HPF
BILIRUB UR QL STRIP: NEGATIVE
CLUE CELLS: NORMAL
COLOR UR AUTO: YELLOW
GLUCOSE UR STRIP-MCNC: NEGATIVE MG/DL
HCG UR QL: NEGATIVE
HGB UR QL STRIP: NEGATIVE
KETONES UR STRIP-MCNC: NEGATIVE MG/DL
LEUKOCYTE ESTERASE UR QL STRIP: ABNORMAL
NITRATE UR QL: NEGATIVE
PH UR STRIP: 7 [PH] (ref 5–8)
RBC #/AREA URNS AUTO: ABNORMAL /HPF
SP GR UR STRIP: 1.02 (ref 1–1.03)
SQUAMOUS #/AREA URNS AUTO: ABNORMAL /LPF
TRICHOMONAS, WET PREP: NORMAL
UROBILINOGEN UR STRIP-ACNC: 2 E.U./DL
WBC #/AREA URNS AUTO: ABNORMAL /HPF
WBC'S/HIGH POWER FIELD, WET PREP: NORMAL
YEAST, WET PREP: NORMAL

## 2023-09-08 PROCEDURE — 87210 SMEAR WET MOUNT SALINE/INK: CPT | Performed by: STUDENT IN AN ORGANIZED HEALTH CARE EDUCATION/TRAINING PROGRAM

## 2023-09-08 PROCEDURE — 99213 OFFICE O/P EST LOW 20 MIN: CPT | Performed by: STUDENT IN AN ORGANIZED HEALTH CARE EDUCATION/TRAINING PROGRAM

## 2023-09-08 PROCEDURE — 81025 URINE PREGNANCY TEST: CPT | Performed by: STUDENT IN AN ORGANIZED HEALTH CARE EDUCATION/TRAINING PROGRAM

## 2023-09-08 PROCEDURE — 81001 URINALYSIS AUTO W/SCOPE: CPT | Performed by: STUDENT IN AN ORGANIZED HEALTH CARE EDUCATION/TRAINING PROGRAM

## 2023-09-09 NOTE — PROGRESS NOTES
Assessment & Plan     Urinary frequency  Differential diagnosis includes urinary tract infection, vaginitis including trichomonas, bacterial vaginosis, yeast infection, irritation from new soaps.  Discussed urine analysis findings with patient.  Not entirely convincing for urinary tract infection.  Discussed increased fluid intake over the next couple days.  If not improving or worsening, recommended to follow-up with primary care provider or urgent care.  Could consider empiric treatment for UTI at that point.  - UA Macroscopic with reflex to Microscopic and Culture - Clinic Collect  - HCG qualitative urine; Future  - HCG qualitative urine  - UA Microscopic with Reflex to Culture  - Wet prep         Maria Elena Holcomb MD  [facility] ALVIN Aldridge is a 33 year old, presenting for the following health issues:  Abdominal Pain (Pelvic pain x yesterday. Contraction sensation. Frequency. Urgency.)      HPI     Abdominal Pain  - since yesterday, feels very heavy down below, increased pressure  - has pelvic pressure  - increased urgency and frequency  - last night felt like she couldn't sleep due to this  - does have nexplanon  - no feeling of anything dropping  - no pressure sensation with BM  - has felt pressure to go to the bathroom constantly  - nexplanon has stopped her periods  - no vaginal discharge, did notice a small odor and slight itching  - does use vagisil soaps      Review of Systems   Constitutional, HEENT, cardiovascular, pulmonary, gi and gu systems are negative, except as otherwise noted.      Objective    /65   Pulse 75   Temp 98.9  F (37.2  C) (Oral)   Resp 16   Wt 60.9 kg (134 lb 3.2 oz)   SpO2 97%   BMI 27.11 kg/m    Body mass index is 27.11 kg/m .  Physical Exam   GENERAL: healthy, alert and no distress  RESP: lungs clear to auscultation - no rales, rhonchi or wheezes  CV: regular rate and rhythm, normal S1 S2, no S3 or S4, no murmur, click or rub, no  peripheral edema and peripheral pulses strong  ABDOMEN: soft, nontender, no hepatosplenomegaly, no masses and bowel sounds normal  MS: no gross musculoskeletal defects noted, no edema    Results for orders placed or performed in visit on 09/08/23   UA Macroscopic with reflex to Microscopic and Culture - Clinic Collect     Status: Abnormal    Specimen: Urine, Clean Catch   Result Value Ref Range    Color Urine Yellow Colorless, Straw, Light Yellow, Yellow    Appearance Urine Clear Clear    Glucose Urine Negative Negative mg/dL    Bilirubin Urine Negative Negative    Ketones Urine Negative Negative mg/dL    Specific Gravity Urine 1.025 1.005 - 1.030    Blood Urine Negative Negative    pH Urine 7.0 5.0 - 8.0    Protein Albumin Urine Negative Negative mg/dL    Urobilinogen Urine 2.0 (A) 0.2, 1.0 E.U./dL    Nitrite Urine Negative Negative    Leukocyte Esterase Urine Trace (A) Negative   HCG qualitative urine     Status: Normal   Result Value Ref Range    hCG Urine Qualitative Negative Negative   UA Microscopic with Reflex to Culture     Status: Abnormal   Result Value Ref Range    Bacteria Urine Moderate (A) None Seen /HPF    RBC Urine 0-2 0-2 /HPF /HPF    WBC Urine 0-5 0-5 /HPF /HPF    Squamous Epithelials Urine Few (A) None Seen /LPF    Narrative    Urine Culture not indicated   Wet prep     Status: Normal    Specimen: Vagina; Swab   Result Value Ref Range    Trichomonas Absent Absent    Yeast Absent Absent    Clue Cells Absent Absent    WBCs/high power field None None

## 2023-09-09 NOTE — PATIENT INSTRUCTIONS
Drink extra water and other fluids for the next day or two. This will help make the urine less concentrated and help wash out the bacteria that are causing the infection. (If you have kidney, heart, or liver disease and have to limit fluids, talk with your doctor before you increase the amount of fluids you drink.)  Avoid drinks that are carbonated or have caffeine. They can irritate the bladder.  Urinate often. Try to empty your bladder each time.  To relieve pain, take a hot bath or lay a heating pad set on low over your lower belly or genital area. Never go to sleep with a heating pad in place.    To prevent UTIs  Drink plenty of water each day. This helps you urinate often, which clears bacteria from your system. (If you have kidney, heart, or liver disease and have to limit fluids, talk with your doctor before you increase the amount of fluids you drink.)  Urinate when you need to.  If you are sexually active, urinate right after you have sex.  Change sanitary pads often.  Avoid douches, bubble baths, feminine hygiene sprays, and other feminine hygiene products that have deodorants.  After going to the bathroom, wipe from front to back.

## 2023-10-15 ENCOUNTER — HEALTH MAINTENANCE LETTER (OUTPATIENT)
Age: 33
End: 2023-10-15

## 2023-11-08 ENCOUNTER — OFFICE VISIT (OUTPATIENT)
Dept: FAMILY MEDICINE | Facility: CLINIC | Age: 33
End: 2023-11-08
Payer: COMMERCIAL

## 2023-11-08 VITALS
RESPIRATION RATE: 20 BRPM | BODY MASS INDEX: 26.91 KG/M2 | TEMPERATURE: 97.8 F | HEART RATE: 64 BPM | HEIGHT: 59 IN | WEIGHT: 133.5 LBS | OXYGEN SATURATION: 98 %

## 2023-11-08 DIAGNOSIS — I49.9 IRREGULAR HEART BEAT: ICD-10-CM

## 2023-11-08 DIAGNOSIS — Z30.46 ENCOUNTER FOR NEXPLANON REMOVAL: ICD-10-CM

## 2023-11-08 DIAGNOSIS — Z00.00 ROUTINE GENERAL MEDICAL EXAMINATION AT A HEALTH CARE FACILITY: Primary | ICD-10-CM

## 2023-11-08 LAB
ATRIAL RATE - MUSE: 75 BPM
DIASTOLIC BLOOD PRESSURE - MUSE: NORMAL MMHG
INTERPRETATION ECG - MUSE: NORMAL
P AXIS - MUSE: 54 DEGREES
PR INTERVAL - MUSE: 154 MS
QRS DURATION - MUSE: 82 MS
QT - MUSE: 412 MS
QTC - MUSE: 460 MS
R AXIS - MUSE: 74 DEGREES
SYSTOLIC BLOOD PRESSURE - MUSE: NORMAL MMHG
T AXIS - MUSE: 29 DEGREES
VENTRICULAR RATE- MUSE: 75 BPM

## 2023-11-08 PROCEDURE — 93010 ELECTROCARDIOGRAM REPORT: CPT | Performed by: INTERNAL MEDICINE

## 2023-11-08 PROCEDURE — 11982 REMOVE DRUG IMPLANT DEVICE: CPT | Performed by: FAMILY MEDICINE

## 2023-11-08 PROCEDURE — 99213 OFFICE O/P EST LOW 20 MIN: CPT | Mod: 25 | Performed by: FAMILY MEDICINE

## 2023-11-08 PROCEDURE — 99395 PREV VISIT EST AGE 18-39: CPT | Mod: 25 | Performed by: FAMILY MEDICINE

## 2023-11-08 PROCEDURE — 93005 ELECTROCARDIOGRAM TRACING: CPT | Performed by: FAMILY MEDICINE

## 2023-11-08 ASSESSMENT — PAIN SCALES - GENERAL: PAINLEVEL: SEVERE PAIN (6)

## 2023-11-08 NOTE — PROGRESS NOTES
"  {PROVIDER CHARTING PREFERENCE:493274}    Chad Aldridge is a 33 year old, presenting for the following health issues:  Establish Care, Nexplanon (Pt has questions regarding her nexplanon ), and Work Note      11/8/2023     6:58 AM   Additional Questions   Roomed by Vladislav ABBOTT   Accompanied by N/A       History of Present Illness       Reason for visit:  Annually checkup    She eats 0-1 servings of fruits and vegetables daily.She consumes 3 sweetened beverage(s) daily.She exercises with enough effort to increase her heart rate 9 or less minutes per day.  She exercises with enough effort to increase her heart rate 3 or less days per week.   She is taking medications regularly.       {MA/LPN/RN Pre-Provider Visit Orders- hCG/UA/Strep (Optional):407251}      Physical Health:  In general, how would you rate your overall physical health? good  Outside of work, how many days during the week do you exercise?{ :153057}  Outside of work, approximately how many minutes a day do you exercise?{ :229756}  If you drink alcohol do you typically have >3 drinks per day or >7 drinks per week? No  Do you usually eat at least 4 servings of fruit and vegetables a day, include whole grains & fiber and avoid regularly eating high fat or \"junk\" foods? Yes  Do you have any problems taking medications regularly? No  Do you have any side effects from medications? not applicable    Mental Health:  In general, how would you rate your overall mental or emotional health? good    Today's PHQ-2 Score:       11/8/2023     6:54 AM   PHQ-2 ( 1999 Pfizer)   Q1: Little interest or pleasure in doing things 0   Q2: Feeling down, depressed or hopeless 0   PHQ-2 Score 0   Q1: Little interest or pleasure in doing things Not at all   Q2: Feeling down, depressed or hopeless Not at all   PHQ-2 Score 0         Do you feel safe in your environment? Yes    Have you ever done Advance Care Planning? (For example, a Health Directive, POLST, or a discussion with a " medical provider or your loved ones about your wishes)? No, advance care planning information given to patient to review.  Advanced care planning was discussed at today's visit.    Fall risk:  { :197489}       SUBJECTIVE:   CC: Oly is an 33 year old who presents for preventive health visit.       11/8/2023     6:58 AM   Additional Questions   Roomed by Vladislav ABBOTT   Accompanied by N/A       [unfilled]    Today's PHQ-2 Score:       11/8/2023     6:54 AM   PHQ-2 ( 1999 Pfizer)   Q1: Little interest or pleasure in doing things 0   Q2: Feeling down, depressed or hopeless 0   PHQ-2 Score 0   Q1: Little interest or pleasure in doing things Not at all   Q2: Feeling down, depressed or hopeless Not at all   PHQ-2 Score 0           {Add if <65 person on Medicare  - Required Questions (Optional):509081}  {Outside tests to abstract? (Optional):051378}    {additional problems to add (Optional):323586}      Social History     Tobacco Use    Smoking status: Never    Smokeless tobacco: Never   Substance Use Topics    Alcohol use: Yes     Alcohol/week: 2.0 standard drinks of alcohol     Types: 2 Standard drinks or equivalent per week     {Rooming staff  Click this link to complete the Prescreen if response below is not for today's visit  Alcohol Use Prescreen >3 drinks/day or > 7 drinks/week.  If the prescreen question answer is YES, complete the full AUDIT  :902682}         No data to display            {add AUDIT responses (Optional) (A score of 7 for adult men is an indication of hazardous drinking; a score of 8 or more is an indication of an alcohol use disorder.  A score of 7 or more for adult women is an indication of hazardous drinking or an alchohol use disorder):142740}  Reviewed orders with patient.  Reviewed health maintenance and updated orders accordingly - { :822995}  {Chronicprobdata (optional):631732}    Breast Cancer Screening:    FHS-7:        No data to display              {If any of the questions to the BCRA  "(FHS-7) are answered yes, consider ordering referral for genetic counseling (Optional) :315607}  {AMB Mammogram Decision Support (Optional) :982289}  Pertinent mammograms are reviewed under the imaging tab.    History of abnormal Pap smear: { :559573}      7/14/2021     8:35 AM   PAP / HPV   PAP Negative for Intraepithelial Lesion or Malignancy (NILM)    HPV 16 DNA Negative    HPV 18 DNA Negative    Other HR HPV Negative      Reviewed and updated as needed this visit by clinical staff   Tobacco  Allergies  Meds  Problems  Med Hx  Surg Hx  Fam Hx          Reviewed and updated as needed this visit by Provider   Tobacco  Allergies  Meds  Problems  Med Hx  Surg Hx  Fam Hx         {HISTORY OPTIONS (Optional):447333}    [unfilled]  {FEMALE ROS (Optional):206041}     OBJECTIVE:   Pulse 64   Temp 97.8  F (36.6  C) (Oral)   Resp 20   Ht 1.501 m (4' 11.1\")   Wt 60.6 kg (133 lb 8 oz)   LMP  (LMP Unknown)   SpO2 98%   BMI 26.87 kg/m    [unfilled]  {Exam Choices (Optional):464585}    {Diagnostic Test Results (Optional):968339}    ASSESSMENT/PLAN:   {Diag Picklist:290862}    {Patient advised of split billing (Optional):852978}      COUNSELING:  {FEMALE COUNSELING MESSAGES:510245::\"Reviewed preventive health counseling, as reflected in patient instructions\"}      BMI:   Estimated body mass index is 26.87 kg/m  as calculated from the following:    Height as of this encounter: 1.501 m (4' 11.1\").    Weight as of this encounter: 60.6 kg (133 lb 8 oz).   {Weight Management Plan needed for ACO:843075}      She reports that she has never smoked. She has never used smokeless tobacco.      {Counseling Resources  [place] Preventive Services Task Force  Cholesterol Screening  Health diet/nutrition  Pooled Cohorts Equation Calculator  USDA's MyPlate  ASA Prophylaxis  Lung CA Screening  Osteoporosis prevention/bone health :670930}  {Breast Cancer Risk Calculator  BRCA-Related Cancer Risk Assessment FHS-7 Tool " :706224}  DO OLGA LIDIA LUND [facility]        Social History     Tobacco Use    Smoking status: Never    Smokeless tobacco: Never   Substance Use Topics    Alcohol use: Yes     Alcohol/week: 2.0 standard drinks of alcohol     Types: 2 Standard drinks or equivalent per week     {Rooming staff  Click this link to complete the Prescreen if response below is not for today's visit  Alcohol Use Prescreen >3 drinks/day or > 7 drinks/week.  If the prescreen question answer is YES, complete the full AUDIT  :105494}         No data to display              Do you have a current opioid prescription? { :048488}  Do you use any other controlled substances or medications that are not prescribed by a provider? {Substance Use :211092}  {Provider  If there are gaps in the social history shown above, please follow the link and refresh the note Link to Social and Substance History :479860}  Current providers sharing in care for this patient include: {Rooming staff:  Please update Care Team in Rooming Activity, refresh this note and then delete this statement}  Patient Care Team:  No Ref-Primary, Physician as PCP - General  Candie St PA-C as Assigned PCP  Apoorva Bocanegra CNM as Assigned OBGYN Provider    The following health maintenance items are reviewed in Epic and correct as of today:  Health Maintenance   Topic Date Due    ANNUAL REVIEW OF HM ORDERS  Never done    COVID-19 Vaccine (1) Never done    HEPATITIS B IMMUNIZATION (3 of 3 - 19+ 3-dose series) 08/07/2017    INFLUENZA VACCINE (1) 09/01/2023    ADVANCE CARE PLANNING  11/06/2024 (Originally 1990)    YEARLY PREVENTIVE VISIT  11/08/2024    HPV TEST  07/14/2026    PAP  07/14/2026    DTAP/TDAP/TD IMMUNIZATION (3 - Td or Tdap) 04/04/2027    HEPATITIS C SCREENING  Completed    HIV SCREENING  Completed    PHQ-2 (once per calendar year)  Completed    Pneumococcal Vaccine: Pediatrics (0 to 5 Years) and At-Risk Patients (6 to 64  "Years)  Aged Out    IPV IMMUNIZATION  Aged Out    HPV IMMUNIZATION  Aged Out    MENINGITIS IMMUNIZATION  Aged Out    RSV MONOCLONAL ANTIBODY  Aged Out       Patient has been advised of split billing requirements and indicates understanding: {YES / NO:566033::\"Yes\"}    Appropriate preventive services were discussed with this patient, including applicable screening as appropriate for fall prevention, nutrition, physical activity, Tobacco-use cessation, weight loss and cognition.  Checklist reviewing preventive services available has been given to the patient.  {additonal problems for provider to add (Optional):278737}      Review of Systems   {ROS COMP (Optional):313810}      Objective    Pulse 64   Temp 97.8  F (36.6  C) (Oral)   Resp 20   Ht 1.501 m (4' 11.1\")   Wt 60.6 kg (133 lb 8 oz)   LMP  (LMP Unknown)   SpO2 98%   BMI 26.87 kg/m    Body mass index is 26.87 kg/m .  Physical Exam   {Exam List (Optional):569475}    {Diagnostic Test Results (Optional):220369}    {AMBULATORY ATTESTATION (Optional):038986}              "

## 2023-11-08 NOTE — LETTER
November 8, 2023      Oly Renee  [address] [address]        To Whom It May Concern:    Oly Renee  was seen on 11/08/2023.  Please excuse her  until 12:00PM due to medical care.        Sincerely,        LEONARDO UNDERWOOD DO

## 2023-11-08 NOTE — PROGRESS NOTES
"   SUBJECTIVE:   CC: Oly is an 33 year old who presents for preventive health visit.       11/8/2023     6:58 AM   Additional Questions   Roomed by Vladislav ABBOTT   Accompanied by N/A       ALMA  Physical Health:  In general, how would you rate your overall physical health? good  Outside of work, how many days during the week do you exercise?none  Outside of work, approximately how many minutes a day do you exercise?not applicable  If you drink alcohol do you typically have >3 drinks per day or >7 drinks per week? No  Do you usually eat at least 4 servings of fruit and vegetables a day, include whole grains & fiber and avoid regularly eating high fat or \"junk\" foods? Yes  Do you have any problems taking medications regularly? No  Do you have any side effects from medications? not applicable     Mental Health:  In general, how would you rate your overall mental or emotional health? good     Today's PHQ-2 Score:        11/8/2023     6:54 AM   PHQ-2 ( 1999 [employer])   Q1: Little interest or pleasure in doing things 0   Q2: Feeling down, depressed or hopeless 0   PHQ-2 Score 0   Q1: Little interest or pleasure in doing things Not at all   Q2: Feeling down, depressed or hopeless Not at all   PHQ-2 Score 0            Do you feel safe in your environment? Yes     Have you ever done Advance Care Planning? (For example, a Health Directive, POLST, or a discussion with a medical provider or your loved ones about your wishes)? No, advance care planning information given   to patient to review.  Advanced care planning was discussed at today's visit.    Nexplanon Removal  -Patient feel like it is painful. She also reports gassiness and bloating.   -Patient does not want to go other birth control.    Today's PHQ-2 Score:       11/8/2023     6:54 AM   PHQ-2 ( 1999 [employer])   Q1: Little interest or pleasure in doing things 0   Q2: Feeling down, depressed or hopeless 0   PHQ-2 Score 0   Q1: Little interest or pleasure in doing things Not at " all   Q2: Feeling down, depressed or hopeless Not at all   PHQ-2 Score 0     Social History     Tobacco Use    Smoking status: Never    Smokeless tobacco: Never   Substance Use Topics    Alcohol use: Yes     Alcohol/week: 2.0 standard drinks of alcohol     Types: 2 Standard drinks or equivalent per week          No data to display              Reviewed orders with patient.  Reviewed health maintenance and updated orders accordingly - Yes  BP Readings from Last 3 Encounters:   09/08/23 110/65   08/18/22 110/70   06/30/22 112/68    Wt Readings from Last 3 Encounters:   11/08/23 60.6 kg (133 lb 8 oz)   09/08/23 60.9 kg (134 lb 3.2 oz)   08/18/22 61 kg (134 lb 6.4 oz)          Breast Cancer Screening:    FHS-7:        No data to display                Patient under 40 years of age: Routine Mammogram Screening not recommended.   Pertinent mammograms are reviewed under the imaging tab.    History of abnormal Pap smear: NO - age 30-65 PAP every 5 years with negative HPV co-testing recommended      7/14/2021     8:35 AM   PAP / HPV   PAP Negative for Intraepithelial Lesion or Malignancy (NILM)    HPV 16 DNA Negative    HPV 18 DNA Negative    Other HR HPV Negative      Reviewed and updated as needed this visit by clinical staff   Tobacco  Allergies  Meds  Problems  Med Hx  Surg Hx  Fam Hx          Reviewed and updated as needed this visit by Provider   Tobacco  Allergies  Meds  Problems  Med Hx  Surg Hx  Fam Hx             Review of Systems  CONSTITUTIONAL: NEGATIVE for fever, chills, change in weight  INTEGUMENTARU/SKIN: NEGATIVE for worrisome rashes, moles or lesions  EYES: NEGATIVE for vision changes or irritation  ENT: NEGATIVE for ear, mouth and throat problems  RESP: NEGATIVE for significant cough or SOB  BREAST: NEGATIVE for masses, tenderness or discharge  CV: NEGATIVE for chest pain, palpitations or peripheral edema  GI: NEGATIVE for nausea, abdominal pain, heartburn, or change in bowel habits  :  "NEGATIVE for unusual urinary or vaginal symptoms. Periods are regular.  MUSCULOSKELETAL: NEGATIVE for significant arthralgias or myalgia  NEURO: NEGATIVE for weakness, dizziness or paresthesias  PSYCHIATRIC: NEGATIVE for changes in mood or affect     OBJECTIVE:   Pulse 64   Temp 97.8  F (36.6  C) (Oral)   Resp 20   Ht 1.501 m (4' 11.1\")   Wt 60.6 kg (133 lb 8 oz)   LMP  (LMP Unknown)   SpO2 98%   BMI 26.87 kg/m    Physical Exam  GENERAL: healthy, alert and no distress  EYES: Eyes grossly normal to inspection, PERRL and conjunctivae and sclerae normal  HENT: ear canals and TM's normal, nose and mouth without ulcers or lesions  NECK: no adenopathy, no asymmetry, masses, or scars and thyroid normal to palpation  RESP: lungs clear to auscultation - no rales, rhonchi or wheezes  CV: irregular rate and rhythm with some regular beats, normal S1 S2, no S3 or S4, no murmur, click or rub, no peripheral edema and peripheral pulses strong  ABDOMEN: soft, nontender, no hepatosplenomegaly, no masses and bowel sounds normal  MS: no gross musculoskeletal defects noted, no edema  SKIN: no suspicious lesions or rashes    Diagnostic Test Results:  none     ECG results from 11/08/23   EKG 12-lead, tracing only     Value    Systolic Blood Pressure     Diastolic Blood Pressure     Ventricular Rate 75    Atrial Rate 75    MD Interval 154    QRS Duration 82        QTc 460    P Axis 54    R AXIS 74    T Axis 29    Interpretation ECG      Sinus rhythm with frequent Premature ventricular complexes  Nonspecific T wave abnormality  Prolonged QT  Abnormal ECG  When compared with ECG of 20-MAR-2014 21:55,  T wave inversion less evident in Anterior leads  QT has lengthened           ASSESSMENT/PLAN:   Oly was seen today for establish care, nexplanon and work note.    Diagnoses and all orders for this visit:    Routine general medical examination at a health care facility  Patient here for routine medical care and contraceptive " "removal. Declined vaccinations today.     Encounter for Nexplanon removal  Desires removal, see below for procedure note.   Nexplanon Removal:   Preoperative Diagnosis: etonogestrel implant  Postoperative Diagnosis: etonogestrel implant removed    Technique: On the left arm  Skin prep Betadine  Anesthesia 1% lidocaine, with epi  Procedure: Small incision (<5mm) was made at distal end of palpable implant, curved hemostat or mosquito forceps was used to isolate the implant and bring it to the incision, the fibrous capsule containing the implant  was incised and the Implant was removed intact.    EBL: minimal  Complications:  No  Tolerance:  Pt tolerated procedure well and was in stable condition.   Dressing:    A pressure bandage was placed for the next 12-24 hours.    Contraception was discussed and patient chose the following method none      Follow up: Pt was instructed to call if bleeding, severe pain or foul smell.   -     REMOVAL NEXPLANON    Irregular heart beat  Chronic, known history but not recorded in chart. Patient asymptomatic. EKG reviewed and multiple PVCs on EKG. Could consider prolonged Holter monitor.   -     EKG 12-lead, tracing only    Other orders  -     REVIEW OF HEALTH MAINTENANCE PROTOCOL ORDERS  -     PRIMARY CARE FOLLOW-UP SCHEDULING; Future        Patient has been advised of split billing requirements and indicates understanding: Yes      COUNSELING:  Reviewed preventive health counseling, as reflected in patient instructions      BMI:   Estimated body mass index is 26.87 kg/m  as calculated from the following:    Height as of this encounter: 1.501 m (4' 11.1\").    Weight as of this encounter: 60.6 kg (133 lb 8 oz).   Weight management plan: Discussed healthy diet and exercise guidelines      She reports that she has never smoked. She has never used smokeless tobacco.      LEONARDO UNDERWOOD DO  [facility]  "

## 2023-12-12 ENCOUNTER — OFFICE VISIT (OUTPATIENT)
Dept: FAMILY MEDICINE | Facility: CLINIC | Age: 33
End: 2023-12-12
Payer: COMMERCIAL

## 2023-12-12 VITALS
WEIGHT: 135.5 LBS | BODY MASS INDEX: 27.32 KG/M2 | TEMPERATURE: 98.3 F | SYSTOLIC BLOOD PRESSURE: 112 MMHG | RESPIRATION RATE: 14 BRPM | HEIGHT: 59 IN | OXYGEN SATURATION: 96 % | HEART RATE: 87 BPM | DIASTOLIC BLOOD PRESSURE: 70 MMHG

## 2023-12-12 DIAGNOSIS — Z23 ENCOUNTER FOR VACCINATION: ICD-10-CM

## 2023-12-12 DIAGNOSIS — I49.9 IRREGULAR HEART BEAT: Primary | ICD-10-CM

## 2023-12-12 PROCEDURE — 99212 OFFICE O/P EST SF 10 MIN: CPT | Mod: 25 | Performed by: FAMILY MEDICINE

## 2023-12-12 PROCEDURE — 90471 IMMUNIZATION ADMIN: CPT | Performed by: FAMILY MEDICINE

## 2023-12-12 PROCEDURE — 90686 IIV4 VACC NO PRSV 0.5 ML IM: CPT | Performed by: FAMILY MEDICINE

## 2023-12-12 NOTE — PROGRESS NOTES
"  Assessment & Plan     Irregular heart beat  Concern from prior well visit. Since resolved. Discussed benign nature, and ways to prevent it such as minimizing caffeine intake, getting adequate sleep.     Encounter for vaccination  - INFLUENZA VACCINE IM > 6 MONTHS VALENT IIV4 (AFLURIA/FLUZONE)      Review of the result(s) of each unique test - prior EKG  I spent a total of 7 minutes on the day of the visit.   Time spent by me doing chart review, history and exam, documentation and further activities per the note       See Patient Instructions    LEONARDO UNDERWOOD DO  [facility] ALVIN Aldridge is a 33 year old, presenting for the following health issues:  Follow Up        12/12/2023     3:55 PM   Additional Questions   Roomed by Alexandra MADRID CMA   Accompanied by N/A       History of Present Illness       Reason for visit:  Follow up check up required from doctor    She eats 0-1 servings of fruits and vegetables daily.She consumes 3 sweetened beverage(s) daily.She exercises with enough effort to increase her heart rate 9 or less minutes per day.  She exercises with enough effort to increase her heart rate 3 or less days per week.   She is taking medications regularly.         Review of Systems   Constitutional, HEENT, cardiovascular, pulmonary, gi and gu systems are negative, except as otherwise noted.      Objective    /70 (BP Location: Right arm, Patient Position: Sitting, Cuff Size: Adult Regular)   Pulse 87   Temp 98.3  F (36.8  C) (Oral)   Resp 14   Ht 1.501 m (4' 11.1\")   Wt 61.5 kg (135 lb 8 oz)   LMP 12/05/2023 (Within Days)   SpO2 96%   BMI 27.28 kg/m    Body mass index is 27.28 kg/m .  Physical Exam   GENERAL: healthy, alert and no distress  EYES: Eyes grossly normal to inspection, PERRL and conjunctivae and sclerae normal  NECK: no adenopathy, no asymmetry, masses, or scars and thyroid normal to palpation  MS: no gross musculoskeletal defects noted, no " edema  SKIN: no suspicious lesions or rashes

## 2024-10-09 ENCOUNTER — PATIENT OUTREACH (OUTPATIENT)
Dept: CARE COORDINATION | Facility: CLINIC | Age: 34
End: 2024-10-09
Payer: COMMERCIAL

## 2024-10-23 ENCOUNTER — PATIENT OUTREACH (OUTPATIENT)
Dept: CARE COORDINATION | Facility: CLINIC | Age: 34
End: 2024-10-23
Payer: COMMERCIAL

## 2024-12-08 ENCOUNTER — HEALTH MAINTENANCE LETTER (OUTPATIENT)
Age: 34
End: 2024-12-08